# Patient Record
Sex: FEMALE | Race: WHITE
[De-identification: names, ages, dates, MRNs, and addresses within clinical notes are randomized per-mention and may not be internally consistent; named-entity substitution may affect disease eponyms.]

---

## 2017-07-17 ENCOUNTER — TRANSCRIPTION ENCOUNTER (OUTPATIENT)
Age: 27
End: 2017-07-17

## 2018-02-25 ENCOUNTER — TRANSCRIPTION ENCOUNTER (OUTPATIENT)
Age: 28
End: 2018-02-25

## 2018-06-25 ENCOUNTER — TRANSCRIPTION ENCOUNTER (OUTPATIENT)
Age: 28
End: 2018-06-25

## 2018-09-07 ENCOUNTER — APPOINTMENT (OUTPATIENT)
Dept: FAMILY MEDICINE | Facility: CLINIC | Age: 28
End: 2018-09-07
Payer: COMMERCIAL

## 2018-09-07 VITALS
BODY MASS INDEX: 19.32 KG/M2 | OXYGEN SATURATION: 98 % | RESPIRATION RATE: 13 BRPM | HEIGHT: 62 IN | HEART RATE: 107 BPM | TEMPERATURE: 98 F | WEIGHT: 105 LBS | SYSTOLIC BLOOD PRESSURE: 108 MMHG | DIASTOLIC BLOOD PRESSURE: 66 MMHG

## 2018-09-07 DIAGNOSIS — Z86.61 PERSONAL HISTORY OF INFECTIONS OF THE CENTRAL NERVOUS SYSTEM: ICD-10-CM

## 2018-09-07 DIAGNOSIS — H04.123 DRY EYE SYNDROME OF BILATERAL LACRIMAL GLANDS: ICD-10-CM

## 2018-09-07 PROCEDURE — 99204 OFFICE O/P NEW MOD 45 MIN: CPT | Mod: 25

## 2018-09-07 PROCEDURE — 36415 COLL VENOUS BLD VENIPUNCTURE: CPT

## 2018-09-10 ENCOUNTER — TRANSCRIPTION ENCOUNTER (OUTPATIENT)
Age: 28
End: 2018-09-10

## 2018-09-10 LAB
ALBUMIN SERPL ELPH-MCNC: 4.6 G/DL
ALP BLD-CCNC: 48 U/L
ALT SERPL-CCNC: 17 U/L
ANION GAP SERPL CALC-SCNC: 14 MMOL/L
AST SERPL-CCNC: 20 U/L
BASOPHILS # BLD AUTO: 0.06 K/UL
BASOPHILS NFR BLD AUTO: 1.2 %
BILIRUB SERPL-MCNC: 1.2 MG/DL
BUN SERPL-MCNC: 12 MG/DL
CALCIUM SERPL-MCNC: 9.9 MG/DL
CHLORIDE SERPL-SCNC: 104 MMOL/L
CO2 SERPL-SCNC: 24 MMOL/L
CREAT SERPL-MCNC: 0.69 MG/DL
CRP SERPL-MCNC: 0.22 MG/DL
EOSINOPHIL # BLD AUTO: 0.06 K/UL
EOSINOPHIL NFR BLD AUTO: 1.2 %
ERYTHROCYTE [SEDIMENTATION RATE] IN BLOOD BY WESTERGREN METHOD: 2 MM/HR
GLUCOSE SERPL-MCNC: 72 MG/DL
HCT VFR BLD CALC: 43.1 %
HGB BLD-MCNC: 14.1 G/DL
IMM GRANULOCYTES NFR BLD AUTO: 0.2 %
LYMPHOCYTES # BLD AUTO: 1.84 K/UL
LYMPHOCYTES NFR BLD AUTO: 36.6 %
MAN DIFF?: NORMAL
MCHC RBC-ENTMCNC: 31 PG
MCHC RBC-ENTMCNC: 32.7 GM/DL
MCV RBC AUTO: 94.7 FL
MONOCYTES # BLD AUTO: 0.25 K/UL
MONOCYTES NFR BLD AUTO: 5 %
NEUTROPHILS # BLD AUTO: 2.81 K/UL
NEUTROPHILS NFR BLD AUTO: 55.8 %
PLATELET # BLD AUTO: 331 K/UL
POTASSIUM SERPL-SCNC: 4.6 MMOL/L
PROT SERPL-MCNC: 7.2 G/DL
RBC # BLD: 4.55 M/UL
RBC # FLD: 13.5 %
RHEUMATOID FACT SER QL: <10 IU/ML
SODIUM SERPL-SCNC: 142 MMOL/L
TSH SERPL-ACNC: 1.3 UIU/ML
URATE SERPL-MCNC: 3.8 MG/DL
VIT B12 SERPL-MCNC: 313 PG/ML
WBC # FLD AUTO: 5.03 K/UL

## 2018-09-11 LAB
DSDNA AB SER-ACNC: 284 IU/ML
EBV DNA SERPL NAA+PROBE-ACNC: NOT DETECTED

## 2018-09-12 ENCOUNTER — APPOINTMENT (OUTPATIENT)
Dept: FAMILY MEDICINE | Facility: CLINIC | Age: 28
End: 2018-09-12
Payer: COMMERCIAL

## 2018-09-12 ENCOUNTER — RESULT REVIEW (OUTPATIENT)
Age: 28
End: 2018-09-12

## 2018-09-12 VITALS
WEIGHT: 105 LBS | OXYGEN SATURATION: 98 % | TEMPERATURE: 99.8 F | HEART RATE: 105 BPM | RESPIRATION RATE: 13 BRPM | DIASTOLIC BLOOD PRESSURE: 68 MMHG | HEIGHT: 62 IN | SYSTOLIC BLOOD PRESSURE: 112 MMHG | BODY MASS INDEX: 19.32 KG/M2

## 2018-09-12 DIAGNOSIS — R76.8 OTHER SPECIFIED ABNORMAL IMMUNOLOGICAL FINDINGS IN SERUM: ICD-10-CM

## 2018-09-12 DIAGNOSIS — M25.50 PAIN IN UNSPECIFIED JOINT: ICD-10-CM

## 2018-09-12 LAB
ANA PAT FLD IF-IMP: ABNORMAL
ANA SER IF-ACNC: ABNORMAL

## 2018-09-12 PROCEDURE — G0008: CPT | Mod: 59

## 2018-09-12 PROCEDURE — 99214 OFFICE O/P EST MOD 30 MIN: CPT | Mod: 25

## 2018-09-12 PROCEDURE — 90686 IIV4 VACC NO PRSV 0.5 ML IM: CPT

## 2018-09-14 ENCOUNTER — RESULT REVIEW (OUTPATIENT)
Age: 28
End: 2018-09-14

## 2018-09-14 LAB

## 2018-09-21 ENCOUNTER — APPOINTMENT (OUTPATIENT)
Dept: FAMILY MEDICINE | Facility: CLINIC | Age: 28
End: 2018-09-21

## 2018-10-10 ENCOUNTER — APPOINTMENT (OUTPATIENT)
Dept: FAMILY MEDICINE | Facility: CLINIC | Age: 28
End: 2018-10-10
Payer: COMMERCIAL

## 2018-10-10 ENCOUNTER — LABORATORY RESULT (OUTPATIENT)
Age: 28
End: 2018-10-10

## 2018-10-10 VITALS
HEIGHT: 62 IN | TEMPERATURE: 98.7 F | BODY MASS INDEX: 19.32 KG/M2 | OXYGEN SATURATION: 97 % | RESPIRATION RATE: 13 BRPM | HEART RATE: 80 BPM | SYSTOLIC BLOOD PRESSURE: 110 MMHG | DIASTOLIC BLOOD PRESSURE: 78 MMHG | WEIGHT: 105 LBS

## 2018-10-10 PROCEDURE — 36415 COLL VENOUS BLD VENIPUNCTURE: CPT

## 2018-10-10 PROCEDURE — 99214 OFFICE O/P EST MOD 30 MIN: CPT | Mod: 25

## 2018-10-11 LAB
ALBUMIN MFR SERPL ELPH: 62.5 %
ALBUMIN SERPL-MCNC: 4.3 G/DL
ALBUMIN/GLOB SERPL: 1.7 RATIO
ALPHA1 GLOB MFR SERPL ELPH: 4.9 %
ALPHA1 GLOB SERPL ELPH-MCNC: 0.3 G/DL
ALPHA2 GLOB MFR SERPL ELPH: 8.9 %
ALPHA2 GLOB SERPL ELPH-MCNC: 0.6 G/DL
B-GLOBULIN MFR SERPL ELPH: 10.3 %
B-GLOBULIN SERPL ELPH-MCNC: 0.7 G/DL
BASOPHILS # BLD AUTO: 0.04 K/UL
BASOPHILS NFR BLD AUTO: 0.8 %
CALCIUM SERPL-MCNC: 10.1 MG/DL
EOSINOPHIL # BLD AUTO: 0.08 K/UL
EOSINOPHIL NFR BLD AUTO: 1.5 %
GAMMA GLOB FLD ELPH-MCNC: 0.9 G/DL
GAMMA GLOB MFR SERPL ELPH: 13.4 %
HCT VFR BLD CALC: 41.4 %
HGB BLD-MCNC: 13.3 G/DL
IMM GRANULOCYTES NFR BLD AUTO: 0.2 %
INTERPRETATION SERPL IEP-IMP: NORMAL
LYMPHOCYTES # BLD AUTO: 1.77 K/UL
LYMPHOCYTES NFR BLD AUTO: 34.2 %
MAN DIFF?: NORMAL
MCHC RBC-ENTMCNC: 30.3 PG
MCHC RBC-ENTMCNC: 32.1 GM/DL
MCV RBC AUTO: 94.3 FL
MONOCYTES # BLD AUTO: 0.28 K/UL
MONOCYTES NFR BLD AUTO: 5.4 %
NEUTROPHILS # BLD AUTO: 3 K/UL
NEUTROPHILS NFR BLD AUTO: 57.9 %
PLATELET # BLD AUTO: 316 K/UL
PROT SERPL-MCNC: 6.9 G/DL
PROT SERPL-MCNC: 6.9 G/DL
RBC # BLD: 4.39 M/UL
RBC # FLD: 13.2 %
WBC # FLD AUTO: 5.18 K/UL

## 2018-10-15 ENCOUNTER — EMERGENCY (EMERGENCY)
Facility: HOSPITAL | Age: 28
LOS: 1 days | Discharge: HOME | End: 2018-10-15
Attending: EMERGENCY MEDICINE
Payer: COMMERCIAL

## 2018-10-15 ENCOUNTER — RESULT REVIEW (OUTPATIENT)
Age: 28
End: 2018-10-15

## 2018-10-15 ENCOUNTER — TRANSCRIPTION ENCOUNTER (OUTPATIENT)
Age: 28
End: 2018-10-15

## 2018-10-15 VITALS
DIASTOLIC BLOOD PRESSURE: 60 MMHG | OXYGEN SATURATION: 99 % | SYSTOLIC BLOOD PRESSURE: 108 MMHG | RESPIRATION RATE: 16 BRPM | HEART RATE: 113 BPM

## 2018-10-15 VITALS — WEIGHT: 104.94 LBS | HEIGHT: 61 IN

## 2018-10-15 DIAGNOSIS — Z98.82 BREAST IMPLANT STATUS: Chronic | ICD-10-CM

## 2018-10-15 DIAGNOSIS — M32.9 SYSTEMIC LUPUS ERYTHEMATOSUS, UNSPECIFIED: ICD-10-CM

## 2018-10-15 DIAGNOSIS — I30.1 INFECTIVE PERICARDITIS: ICD-10-CM

## 2018-10-15 DIAGNOSIS — R07.89 OTHER CHEST PAIN: ICD-10-CM

## 2018-10-15 DIAGNOSIS — Z98.890 OTHER SPECIFIED POSTPROCEDURAL STATES: Chronic | ICD-10-CM

## 2018-10-15 LAB
ALBUMIN SERPL ELPH-MCNC: 4.3 G/DL — SIGNIFICANT CHANGE UP (ref 3.3–5.2)
ALP SERPL-CCNC: 47 U/L — SIGNIFICANT CHANGE UP (ref 40–120)
ALT FLD-CCNC: 10 U/L — SIGNIFICANT CHANGE UP
ANION GAP SERPL CALC-SCNC: 12 MMOL/L — SIGNIFICANT CHANGE UP (ref 5–17)
AST SERPL-CCNC: 14 U/L — SIGNIFICANT CHANGE UP
BASOPHILS # BLD AUTO: 0 K/UL — SIGNIFICANT CHANGE UP (ref 0–0.2)
BASOPHILS NFR BLD AUTO: 0.1 % — SIGNIFICANT CHANGE UP (ref 0–2)
BILIRUB SERPL-MCNC: 1.5 MG/DL — SIGNIFICANT CHANGE UP (ref 0.4–2)
BUN SERPL-MCNC: 9 MG/DL — SIGNIFICANT CHANGE UP (ref 8–20)
CALCIUM SERPL-MCNC: 10.1 MG/DL — SIGNIFICANT CHANGE UP (ref 8.6–10.2)
CHLORIDE SERPL-SCNC: 104 MMOL/L — SIGNIFICANT CHANGE UP (ref 98–107)
CO2 SERPL-SCNC: 24 MMOL/L — SIGNIFICANT CHANGE UP (ref 22–29)
CREAT SERPL-MCNC: 0.54 MG/DL — SIGNIFICANT CHANGE UP (ref 0.5–1.3)
D DIMER BLD IA.RAPID-MCNC: 918 NG/ML DDU — HIGH
EOSINOPHIL # BLD AUTO: 0.1 K/UL — SIGNIFICANT CHANGE UP (ref 0–0.5)
EOSINOPHIL NFR BLD AUTO: 1.3 % — SIGNIFICANT CHANGE UP (ref 0–6)
GLUCOSE SERPL-MCNC: 91 MG/DL — SIGNIFICANT CHANGE UP (ref 70–115)
HCT VFR BLD CALC: 42.1 % — SIGNIFICANT CHANGE UP (ref 37–47)
HGB BLD-MCNC: 14.2 G/DL — SIGNIFICANT CHANGE UP (ref 12–16)
IGA 24H UR QL IFE: NORMAL
LACTATE BLDV-MCNC: 1.5 MMOL/L — SIGNIFICANT CHANGE UP (ref 0.5–2)
LYMPHOCYTES # BLD AUTO: 0.7 K/UL — LOW (ref 1–4.8)
LYMPHOCYTES # BLD AUTO: 6.6 % — LOW (ref 20–55)
MCHC RBC-ENTMCNC: 31.1 PG — HIGH (ref 27–31)
MCHC RBC-ENTMCNC: 33.7 G/DL — SIGNIFICANT CHANGE UP (ref 32–36)
MCV RBC AUTO: 92.3 FL — SIGNIFICANT CHANGE UP (ref 81–99)
MONOCYTES # BLD AUTO: 0.2 K/UL — SIGNIFICANT CHANGE UP (ref 0–0.8)
MONOCYTES NFR BLD AUTO: 1.9 % — LOW (ref 3–10)
NEUTROPHILS # BLD AUTO: 10 K/UL — HIGH (ref 1.8–8)
NEUTROPHILS NFR BLD AUTO: 89.7 % — HIGH (ref 37–73)
PLATELET # BLD AUTO: 225 K/UL — SIGNIFICANT CHANGE UP (ref 150–400)
POTASSIUM SERPL-MCNC: 4 MMOL/L — SIGNIFICANT CHANGE UP (ref 3.5–5.3)
POTASSIUM SERPL-SCNC: 4 MMOL/L — SIGNIFICANT CHANGE UP (ref 3.5–5.3)
PROT SERPL-MCNC: 7.3 G/DL — SIGNIFICANT CHANGE UP (ref 6.6–8.7)
RAPID RVP RESULT: DETECTED
RBC # BLD: 4.56 M/UL — SIGNIFICANT CHANGE UP (ref 4.4–5.2)
RBC # FLD: 12.7 % — SIGNIFICANT CHANGE UP (ref 11–15.6)
RV+EV RNA SPEC QL NAA+PROBE: DETECTED
SODIUM SERPL-SCNC: 140 MMOL/L — SIGNIFICANT CHANGE UP (ref 135–145)
TROPONIN T SERPL-MCNC: <0.01 NG/ML — SIGNIFICANT CHANGE UP (ref 0–0.06)
WBC # BLD: 11.1 K/UL — HIGH (ref 4.8–10.8)
WBC # FLD AUTO: 11.1 K/UL — HIGH (ref 4.8–10.8)

## 2018-10-15 PROCEDURE — 85379 FIBRIN DEGRADATION QUANT: CPT

## 2018-10-15 PROCEDURE — 84702 CHORIONIC GONADOTROPIN TEST: CPT

## 2018-10-15 PROCEDURE — 96367 TX/PROPH/DG ADDL SEQ IV INF: CPT | Mod: XU

## 2018-10-15 PROCEDURE — 71275 CT ANGIOGRAPHY CHEST: CPT | Mod: 26

## 2018-10-15 PROCEDURE — 87581 M.PNEUMON DNA AMP PROBE: CPT

## 2018-10-15 PROCEDURE — 93010 ELECTROCARDIOGRAM REPORT: CPT

## 2018-10-15 PROCEDURE — 93306 TTE W/DOPPLER COMPLETE: CPT

## 2018-10-15 PROCEDURE — 71275 CT ANGIOGRAPHY CHEST: CPT

## 2018-10-15 PROCEDURE — 99285 EMERGENCY DEPT VISIT HI MDM: CPT

## 2018-10-15 PROCEDURE — 99284 EMERGENCY DEPT VISIT MOD MDM: CPT | Mod: 25

## 2018-10-15 PROCEDURE — 87040 BLOOD CULTURE FOR BACTERIA: CPT

## 2018-10-15 PROCEDURE — 80053 COMPREHEN METABOLIC PANEL: CPT

## 2018-10-15 PROCEDURE — 96361 HYDRATE IV INFUSION ADD-ON: CPT

## 2018-10-15 PROCEDURE — 36415 COLL VENOUS BLD VENIPUNCTURE: CPT

## 2018-10-15 PROCEDURE — 87798 DETECT AGENT NOS DNA AMP: CPT

## 2018-10-15 PROCEDURE — 87633 RESP VIRUS 12-25 TARGETS: CPT

## 2018-10-15 PROCEDURE — 85652 RBC SED RATE AUTOMATED: CPT

## 2018-10-15 PROCEDURE — 86140 C-REACTIVE PROTEIN: CPT

## 2018-10-15 PROCEDURE — 71046 X-RAY EXAM CHEST 2 VIEWS: CPT | Mod: 26

## 2018-10-15 PROCEDURE — 85027 COMPLETE CBC AUTOMATED: CPT

## 2018-10-15 PROCEDURE — 84484 ASSAY OF TROPONIN QUANT: CPT

## 2018-10-15 PROCEDURE — 96365 THER/PROPH/DIAG IV INF INIT: CPT | Mod: XU

## 2018-10-15 PROCEDURE — 87486 CHLMYD PNEUM DNA AMP PROBE: CPT

## 2018-10-15 PROCEDURE — 71046 X-RAY EXAM CHEST 2 VIEWS: CPT

## 2018-10-15 PROCEDURE — 99284 EMERGENCY DEPT VISIT MOD MDM: CPT

## 2018-10-15 PROCEDURE — 93005 ELECTROCARDIOGRAM TRACING: CPT

## 2018-10-15 PROCEDURE — 83605 ASSAY OF LACTIC ACID: CPT

## 2018-10-15 RX ORDER — VANCOMYCIN HCL 1 G
1000 VIAL (EA) INTRAVENOUS ONCE
Refills: 0 | Status: COMPLETED | OUTPATIENT
Start: 2018-10-15 | End: 2018-10-15

## 2018-10-15 RX ORDER — PIPERACILLIN AND TAZOBACTAM 4; .5 G/20ML; G/20ML
3.38 INJECTION, POWDER, LYOPHILIZED, FOR SOLUTION INTRAVENOUS ONCE
Refills: 0 | Status: COMPLETED | OUTPATIENT
Start: 2018-10-15 | End: 2018-10-15

## 2018-10-15 RX ORDER — SODIUM CHLORIDE 9 MG/ML
2000 INJECTION INTRAMUSCULAR; INTRAVENOUS; SUBCUTANEOUS ONCE
Refills: 0 | Status: COMPLETED | OUTPATIENT
Start: 2018-10-15 | End: 2018-10-15

## 2018-10-15 RX ORDER — IBUPROFEN 200 MG
400 TABLET ORAL ONCE
Refills: 0 | Status: COMPLETED | OUTPATIENT
Start: 2018-10-15 | End: 2018-10-15

## 2018-10-15 RX ORDER — IBUPROFEN 200 MG
1 TABLET ORAL
Qty: 21 | Refills: 0
Start: 2018-10-15 | End: 2018-10-21

## 2018-10-15 RX ORDER — FAMOTIDINE 10 MG/ML
1 INJECTION INTRAVENOUS
Qty: 14 | Refills: 0
Start: 2018-10-15 | End: 2018-10-21

## 2018-10-15 RX ADMIN — PIPERACILLIN AND TAZOBACTAM 3.38 GRAM(S): 4; .5 INJECTION, POWDER, LYOPHILIZED, FOR SOLUTION INTRAVENOUS at 13:31

## 2018-10-15 RX ADMIN — SODIUM CHLORIDE 1000 MILLILITER(S): 9 INJECTION INTRAMUSCULAR; INTRAVENOUS; SUBCUTANEOUS at 11:38

## 2018-10-15 RX ADMIN — Medication 1000 MILLIGRAM(S): at 15:54

## 2018-10-15 RX ADMIN — PIPERACILLIN AND TAZOBACTAM 200 GRAM(S): 4; .5 INJECTION, POWDER, LYOPHILIZED, FOR SOLUTION INTRAVENOUS at 13:01

## 2018-10-15 RX ADMIN — Medication 400 MILLIGRAM(S): at 11:38

## 2018-10-15 RX ADMIN — Medication 250 MILLIGRAM(S): at 14:54

## 2018-10-15 RX ADMIN — Medication 400 MILLIGRAM(S): at 12:38

## 2018-10-15 RX ADMIN — SODIUM CHLORIDE 2000 MILLILITER(S): 9 INJECTION INTRAMUSCULAR; INTRAVENOUS; SUBCUTANEOUS at 14:00

## 2018-10-15 NOTE — ED ADULT NURSE NOTE - NSIMPLEMENTINTERV_GEN_ALL_ED
Implemented All Universal Safety Interventions:  [place] to call system. Call bell, personal items and telephone within reach. Instruct patient to call for assistance. Room bathroom lighting operational. Non-slip footwear when patient is off stretcher. Physically safe environment: no spills, clutter or unnecessary equipment. Stretcher in lowest position, wheels locked, appropriate side rails in place.

## 2018-10-15 NOTE — ED PROVIDER NOTE - CARE PLAN
Principal Discharge DX:	URI (upper respiratory infection)  Secondary Diagnosis:	Acute viral pericarditis

## 2018-10-15 NOTE — CONSULT NOTE ADULT - SUBJECTIVE AND OBJECTIVE BOX
[place] [facility]                                                        Office: [address] [address]                                                       Telephone: [phone]. Fax:[phone]                                                              CARDIOLOGY CONSULTATION NOTE                                                                                             Consult requested by:  Johan Montejo MD (ER physician)     Reason for Consultation: chest pain     History obtained by: Patient and medical record     obtained: No    Chief complaint:    Patient is a 28y old  Female who presents with a chief complaint of chest pain     HPI:  This is a 28 year old woman with recent diagnossis of Lupus, 4 weeks ago, On NSAID PRN and on plaquanil presents with chest pain   Onset:   1 days  Type: Pressure;  Intensity:  8 /10 ; Duration: 1  days; each episode last: > 1 hr   Radiation: none ,Associated symptoms: none  Patient states she feels worse when she lies flat.  She has not been able to sleep.  She also noticess fast heart beat.        REVIEW OF SYMPTOMS: Cardiovascular:  See HPI. +  chest pain,  No dyspnea,  No syncope, +  palpitations, No dizziness, No Orthopnea,      No Paroxsymal nocturnal dyspnea;  Respiratory:  No Dyspnea, No cough,     Genitourinary:  No dysuria, no hematuria; Gastrointestinal:  No nausea, no vomiting. No diarrhea.  No abdominal pain. No dark color stool, no melena ; Neurological: No headache, no dizziness, no slurred speech;  Psychiatric: No agitation, no anxiety.  ALL OTHER REVIEW OF SYSTEMS ARE NEGATIVE.    ALLERGIES: Allergies    sulfa drugs (Hives)    Intolerances          CURRENT MEDICATIONS:         HOME MEDICATIONS:  Plaquanil  NSAID PRN     PAST MEDICAL HISTORY  Systemic lupus erythematosus, unspecified SLE type, unspecified organ involvement status      PAST SURGICAL HISTORY  History of rhinoplasty  History of elective breast augmentation      FAMILY HISTORY:  No pertinent family history in first degree relatives      SOCIAL HISTORY:  Denies smoking/alcohol/drugs      Vital Signs Last 24 Hrs  T(C): 38.6 (15 Oct 2018 11:57), Max: 38.6 (15 Oct 2018 11:57)  T(F): 101.4 (15 Oct 2018 11:57), Max: 101.4 (15 Oct 2018 11:57)  HR: 113 (15 Oct 2018 17:08) (101 - 138)  BP: 108/60 (15 Oct 2018 17:08) (99/57 - 119/81)  BP(mean): --  RR: 16 (15 Oct 2018 17:08) (16 - 18)  SpO2: 99% (15 Oct 2018 17:08) (99% - 101%)      PHYSICAL EXAM:  Constitutional: Comfortable . No acute distress.   HEENT: Atraumatic and normcephalic , neck is supple . no JVD. No carotid bruit. PEERL   CNS: A&Ox3. No focal deficits. EOMI. Cranial nerves II-IX are intact.   Lymph Nodes: Cervical : Not palpable.  Respiratory: CTAB  Cardiovascular: S1S2 RRR. No murmur/rubs or gallop.  Gastrointestinal: Soft non-tender and non distended . +Bowel sounds. negative Rubalcava's sign.  Extremities: No edema.   Psychiatric: Calm . no agitation.  Skin: No skin rash/ulcers visualized to face, hands or feet.    Intake and output:     LABS:                        14.2   11.1  )-----------( 225      ( 15 Oct 2018 11:44 )             42.1     10-15    140  |  104  |  9.0  ----------------------------<  91  4.0   |  24.0  |  0.54    Ca    10.1      15 Oct 2018 11:44    TPro  7.3  /  Alb  4.3  /  TBili  1.5  /  DBili  x   /  AST  14  /  ALT  10  /  AlkPhos  47  10-15    CARDIAC MARKERS ( 15 Oct 2018 11:44 )  x     / <0.01 ng/mL / x     / x     / x        ;p-BNP=        INTERPRETATION OF TELEMETRY: Reviewed by me.   ECG: Reviewed by me. sinus tachycardia. otherwise, normal limits.     RADIOLOGY & ADDITIONAL STUDIES:    X-ray:  reviewed by me.  unremarkable     ECHO FINDINGS: Date:   10/15/2018 : Patient tachycardic during the entire study. normal LV size and function. Normal RV. No significant valvular abnormality.   no effusion

## 2018-10-15 NOTE — ED ADULT TRIAGE NOTE - CHIEF COMPLAINT QUOTE
"I feel SOB since yesterday, I have a cough and feel chest tightness. I went to Urgent care and they said my heart rate was elevated (127 bpm) but my chest x-ray was negative and they told me to come here,. I take birth control and they want to r/o PE.

## 2018-10-15 NOTE — ED ADULT NURSE NOTE - OBJECTIVE STATEMENT
Assumed pt care at 1130.  Pt awake, alert and oriented x3 c/o chest pressure, dyspnea on exertion and congestion. Assumed pt care at 1130.  Pt awake, alert and oriented x3 c/o chest pressure, dyspnea on exertion and congestion.  Pt went to urgent care and was referred to ED secondary to tachypnea to r/o PE.

## 2018-10-15 NOTE — CONSULT NOTE ADULT - ATTENDING COMMENTS
No further in-patient cardiac work-up/management is needed.  Follow-up in cardiology office in 2 weeks.

## 2018-10-15 NOTE — ED STATDOCS - OBJECTIVE STATEMENT
Telemedicine assessment was conducted (using real time 2 way audio-video technology) by Dr. Ember Butts located at [address] [place] [id number]  ++++++++++++++++++++++++  Pertinent patient history and initial plan: 29 y/o F with recently dx of lupus. She c/o diffuse muscle aching with heavy sensation pushing on her chest. Pt states she has been unable to sleep secondary to the feeling of a weight on her chest with exacerbation in supine position, denies any pain. Pt took Amoebic and Tylenol yesterday but denies significant relief. mild rhinorrhea noted with congestion of clogged ears and productive green phlegm today. Pt went to Urgent care with resulting tachy vitals, with clear CXR. Pt is s/p flu shot. Telemedicine assessment was conducted (using real time 2 way audio-video technology) by Dr. Ember Butts located at [address] [place] [id number]  ++++++++++++++++++++++++  Pertinent patient history and initial plan: 27 y/o F with recently dx of lupus. She c/o diffuse muscle aching with heavy sensation pushing on her chest x 2-3 days. Pt states she has been unable to sleep secondary to the feeling of a weight on her chest with exacerbation in supine position, denies any pain. Pt took Mobiv and Tylenol yesterday but denies significant relief. + mild rhinorrhea noted with congestion of clogged ears and productive green phlegm today. Pt is on OCPs. Per pt her recent clotting studies have been normal. No travel or calf pain. Has had flu shot. Pt went to Urgent care with resulting tachy vitals, with clear CXR, told to come to the ED to r/o PE.     On virtual exam pt appears well, nad, not tachypneic, tachycardia noted.    Plan - EKG, sx likely secondary to infectious etiology/viral syndrome but will check trop r/o myocarditis, CXR, dimer r/o PE given risk factors (SLE, OCP use)

## 2018-10-15 NOTE — ED ADULT NURSE REASSESSMENT NOTE - NS ED NURSE REASSESS COMMENT FT1
pt remains awake, alert and oriented x3 with no signs of acute distress, sinus tach on cardiac monitor.  Pt aware of plan of care.

## 2018-10-15 NOTE — CONSULT NOTE ADULT - PROBLEM SELECTOR RECOMMENDATION 9
Pain control. Motrin 600 TID for 1 week. after that BID.   F/u in cardiology office in 2 weeks. Repeat echo. ct plaquanil. PPI.   Rest for few days. No work travel to [place].   Hydration.

## 2018-10-15 NOTE — ED PROVIDER NOTE - OBJECTIVE STATEMENT
Patient is a 28F with newly diagnosed SLE presenting to ED for complaint of SOB.  Patient says she began to develop a "tingly" sensation in her throat 2 days ago, that gradually became more bothersome.  Over the next couple of days, the sensation evolved into a sore throat, associated with productive cough, chest heaviness and shortness of breath at rest.  Patient mentions developing a fever yesterday of 101F and orthopnea, prompting her to go to urgent care this morning.  She was found to by tachycardic at 135bpm and was worked up for PE with a negative chest xray.  On ED arrival she still complains of SOB, fever and chills.  She denies any abdominal pain, vomiting, or diarrhea.  Patient confirms current medications of Plaquenil 200mg and OCP.

## 2018-10-20 LAB
CULTURE RESULTS: SIGNIFICANT CHANGE UP
CULTURE RESULTS: SIGNIFICANT CHANGE UP
SPECIMEN SOURCE: SIGNIFICANT CHANGE UP
SPECIMEN SOURCE: SIGNIFICANT CHANGE UP

## 2018-10-31 ENCOUNTER — APPOINTMENT (OUTPATIENT)
Dept: RHEUMATOLOGY | Facility: CLINIC | Age: 28
End: 2018-10-31

## 2019-03-07 ENCOUNTER — TRANSCRIPTION ENCOUNTER (OUTPATIENT)
Age: 29
End: 2019-03-07

## 2020-10-09 ENCOUNTER — APPOINTMENT (OUTPATIENT)
Dept: FAMILY MEDICINE | Facility: CLINIC | Age: 30
End: 2020-10-09
Payer: COMMERCIAL

## 2020-10-09 ENCOUNTER — LABORATORY RESULT (OUTPATIENT)
Age: 30
End: 2020-10-09

## 2020-10-09 ENCOUNTER — NON-APPOINTMENT (OUTPATIENT)
Age: 30
End: 2020-10-09

## 2020-10-09 VITALS
SYSTOLIC BLOOD PRESSURE: 110 MMHG | TEMPERATURE: 97.5 F | HEART RATE: 90 BPM | DIASTOLIC BLOOD PRESSURE: 70 MMHG | BODY MASS INDEX: 19.88 KG/M2 | OXYGEN SATURATION: 98 % | WEIGHT: 108 LBS | HEIGHT: 62 IN | RESPIRATION RATE: 14 BRPM

## 2020-10-09 DIAGNOSIS — L65.9 NONSCARRING HAIR LOSS, UNSPECIFIED: ICD-10-CM

## 2020-10-09 PROCEDURE — 36415 COLL VENOUS BLD VENIPUNCTURE: CPT

## 2020-10-09 PROCEDURE — 90686 IIV4 VACC NO PRSV 0.5 ML IM: CPT

## 2020-10-09 PROCEDURE — G0008: CPT

## 2020-10-09 PROCEDURE — 99395 PREV VISIT EST AGE 18-39: CPT | Mod: 25

## 2020-10-12 LAB
25(OH)D3 SERPL-MCNC: 47.2 NG/ML
ALBUMIN SERPL ELPH-MCNC: 5.1 G/DL
ALP BLD-CCNC: 53 U/L
ALT SERPL-CCNC: 13 U/L
ANA SER IF-ACNC: NEGATIVE
ANION GAP SERPL CALC-SCNC: 17 MMOL/L
APPEARANCE: CLEAR
AST SERPL-CCNC: 18 U/L
BILIRUB SERPL-MCNC: 1.6 MG/DL
BILIRUBIN URINE: NEGATIVE
BLOOD URINE: ABNORMAL
BUN SERPL-MCNC: 12 MG/DL
C PEPTIDE SERPL-MCNC: 1.4 NG/ML
CALCIUM SERPL-MCNC: 10.7 MG/DL
CHLORIDE SERPL-SCNC: 103 MMOL/L
CHOLEST SERPL-MCNC: 171 MG/DL
CHOLEST/HDLC SERPL: 2.5 RATIO
CO2 SERPL-SCNC: 20 MMOL/L
COLOR: YELLOW
CREAT SERPL-MCNC: 0.67 MG/DL
CREAT SPEC-SCNC: 157 MG/DL
CRP SERPL-MCNC: 0.18 MG/DL
ERYTHROCYTE [SEDIMENTATION RATE] IN BLOOD BY WESTERGREN METHOD: 8 MM/HR
ESTIMATED AVERAGE GLUCOSE: 82 MG/DL
FERRITIN SERPL-MCNC: 83 NG/ML
FOLATE SERPL-MCNC: 12.6 NG/ML
GLUCOSE QUALITATIVE U: NEGATIVE
GLUCOSE SERPL-MCNC: 67 MG/DL
HBA1C MFR BLD HPLC: 4.5 %
HDLC SERPL-MCNC: 69 MG/DL
IRON SATN MFR SERPL: 55 %
IRON SERPL-MCNC: 217 UG/DL
KETONES URINE: NEGATIVE
LDLC SERPL CALC-MCNC: 85 MG/DL
LEUKOCYTE ESTERASE URINE: NEGATIVE
MICROALBUMIN 24H UR DL<=1MG/L-MCNC: 21.7 MG/DL
MICROALBUMIN/CREAT 24H UR-RTO: 138 MG/G
NITRITE URINE: NEGATIVE
PH URINE: 6
POTASSIUM SERPL-SCNC: 4.9 MMOL/L
PROT SERPL-MCNC: 7.5 G/DL
PROTEIN URINE: ABNORMAL
RHEUMATOID FACT SER QL: <10 IU/ML
SARS-COV-2 IGG SERPL IA-ACNC: <0.1 INDEX
SARS-COV-2 IGG SERPL QL IA: NEGATIVE
SODIUM SERPL-SCNC: 140 MMOL/L
SPECIFIC GRAVITY URINE: 1.02
T4 SERPL-MCNC: 11.9 UG/DL
TIBC SERPL-MCNC: 394 UG/DL
TRIGL SERPL-MCNC: 83 MG/DL
TSH SERPL-ACNC: 1.29 UIU/ML
UIBC SERPL-MCNC: 177 UG/DL
UROBILINOGEN URINE: NORMAL
VIT B12 SERPL-MCNC: 359 PG/ML

## 2020-10-15 LAB
CCP AB SER IA-ACNC: <8 UNITS
RF+CCP IGG SER-IMP: NEGATIVE

## 2020-11-03 LAB
BASOPHILS # BLD AUTO: 0.07 K/UL
BASOPHILS NFR BLD AUTO: 1.1 %
EOSINOPHIL # BLD AUTO: 0.05 K/UL
EOSINOPHIL NFR BLD AUTO: 0.8 %
HCT VFR BLD CALC: 48.6 %
HGB BLD-MCNC: 15.6 G/DL
IMM GRANULOCYTES NFR BLD AUTO: 0.2 %
LYMPHOCYTES # BLD AUTO: 1.83 K/UL
LYMPHOCYTES NFR BLD AUTO: 29.9 %
MAN DIFF?: NORMAL
MCHC RBC-ENTMCNC: 31.2 PG
MCHC RBC-ENTMCNC: 32.1 GM/DL
MCV RBC AUTO: 97.2 FL
MONOCYTES # BLD AUTO: 0.28 K/UL
MONOCYTES NFR BLD AUTO: 4.6 %
NEUTROPHILS # BLD AUTO: 3.88 K/UL
NEUTROPHILS NFR BLD AUTO: 63.4 %
PLATELET # BLD AUTO: 339 K/UL
RBC # BLD: 5 M/UL
RBC # FLD: 12.2 %
WBC # FLD AUTO: 6.12 K/UL

## 2020-11-09 ENCOUNTER — APPOINTMENT (OUTPATIENT)
Dept: FAMILY MEDICINE | Facility: CLINIC | Age: 30
End: 2020-11-09
Payer: COMMERCIAL

## 2020-11-09 VITALS
HEIGHT: 62 IN | BODY MASS INDEX: 19.14 KG/M2 | RESPIRATION RATE: 14 BRPM | TEMPERATURE: 97.3 F | SYSTOLIC BLOOD PRESSURE: 110 MMHG | HEART RATE: 87 BPM | DIASTOLIC BLOOD PRESSURE: 70 MMHG | WEIGHT: 104 LBS | OXYGEN SATURATION: 98 %

## 2020-11-09 DIAGNOSIS — E83.19 OTHER DISORDERS OF IRON METABOLISM: ICD-10-CM

## 2020-11-09 DIAGNOSIS — R76.8 OTHER SPECIFIED ABNORMAL IMMUNOLOGICAL FINDINGS IN SERUM: ICD-10-CM

## 2020-11-09 PROCEDURE — 99214 OFFICE O/P EST MOD 30 MIN: CPT | Mod: 25

## 2020-11-09 PROCEDURE — 36415 COLL VENOUS BLD VENIPUNCTURE: CPT

## 2020-11-09 PROCEDURE — 99072 ADDL SUPL MATRL&STAF TM PHE: CPT

## 2020-11-10 LAB
CREAT SPEC-SCNC: 17 MG/DL
DSDNA AB SER-ACNC: 426 IU/ML
MICROALBUMIN 24H UR DL<=1MG/L-MCNC: <1.2 MG/DL
MICROALBUMIN/CREAT 24H UR-RTO: NORMAL MG/G

## 2020-11-13 LAB — HFE GENE MUT ANL BLD/T: NORMAL

## 2021-01-07 ENCOUNTER — APPOINTMENT (OUTPATIENT)
Dept: FAMILY MEDICINE | Facility: CLINIC | Age: 31
End: 2021-01-07
Payer: COMMERCIAL

## 2021-01-07 ENCOUNTER — NON-APPOINTMENT (OUTPATIENT)
Age: 31
End: 2021-01-07

## 2021-01-07 VITALS
HEIGHT: 62 IN | RESPIRATION RATE: 14 BRPM | WEIGHT: 110 LBS | BODY MASS INDEX: 20.24 KG/M2 | HEART RATE: 90 BPM | TEMPERATURE: 98.9 F | OXYGEN SATURATION: 98 % | SYSTOLIC BLOOD PRESSURE: 110 MMHG | DIASTOLIC BLOOD PRESSURE: 70 MMHG

## 2021-01-07 DIAGNOSIS — Z30.41 ENCOUNTER FOR SURVEILLANCE OF CONTRACEPTIVE PILLS: ICD-10-CM

## 2021-01-07 DIAGNOSIS — Z82.61 FAMILY HISTORY OF ARTHRITIS: ICD-10-CM

## 2021-01-07 DIAGNOSIS — Z72.3 LACK OF PHYSICAL EXERCISE: ICD-10-CM

## 2021-01-07 DIAGNOSIS — Z82.5 FAMILY HISTORY OF ASTHMA AND OTHER CHRONIC LOWER RESPIRATORY DISEASES: ICD-10-CM

## 2021-01-07 DIAGNOSIS — Z20.822 CONTACT WITH AND (SUSPECTED) EXPOSURE TO COVID-19: ICD-10-CM

## 2021-01-07 DIAGNOSIS — Z78.9 OTHER SPECIFIED HEALTH STATUS: ICD-10-CM

## 2021-01-07 PROCEDURE — 99072 ADDL SUPL MATRL&STAF TM PHE: CPT

## 2021-01-07 PROCEDURE — 93000 ELECTROCARDIOGRAM COMPLETE: CPT

## 2021-01-07 PROCEDURE — 36415 COLL VENOUS BLD VENIPUNCTURE: CPT

## 2021-01-07 PROCEDURE — 99215 OFFICE O/P EST HI 40 MIN: CPT | Mod: 25

## 2021-01-08 LAB
ALBUMIN SERPL ELPH-MCNC: 4.8 G/DL
ALP BLD-CCNC: 58 U/L
ALT SERPL-CCNC: 19 U/L
ANION GAP SERPL CALC-SCNC: 11 MMOL/L
APPEARANCE: CLEAR
APTT BLD: 30.2 SEC
AST SERPL-CCNC: 17 U/L
BASOPHILS # BLD AUTO: 0.08 K/UL
BASOPHILS NFR BLD AUTO: 1.1 %
BILIRUB SERPL-MCNC: 1.2 MG/DL
BILIRUBIN URINE: NEGATIVE
BLOOD URINE: NEGATIVE
BUN SERPL-MCNC: 11 MG/DL
CALCIUM SERPL-MCNC: 10.8 MG/DL
CHLORIDE SERPL-SCNC: 102 MMOL/L
CO2 SERPL-SCNC: 25 MMOL/L
COLOR: COLORLESS
CREAT SERPL-MCNC: 0.8 MG/DL
EOSINOPHIL # BLD AUTO: 0.03 K/UL
EOSINOPHIL NFR BLD AUTO: 0.4 %
GLUCOSE QUALITATIVE U: NEGATIVE
GLUCOSE SERPL-MCNC: 87 MG/DL
HCT VFR BLD CALC: 46.1 %
HGB BLD-MCNC: 15.1 G/DL
IMM GRANULOCYTES NFR BLD AUTO: 0.3 %
INR PPP: 0.9 RATIO
KETONES URINE: NEGATIVE
LEUKOCYTE ESTERASE URINE: NEGATIVE
LYMPHOCYTES # BLD AUTO: 2.16 K/UL
LYMPHOCYTES NFR BLD AUTO: 29.4 %
MAN DIFF?: NORMAL
MCHC RBC-ENTMCNC: 31.7 PG
MCHC RBC-ENTMCNC: 32.8 GM/DL
MCV RBC AUTO: 96.8 FL
MONOCYTES # BLD AUTO: 0.41 K/UL
MONOCYTES NFR BLD AUTO: 5.6 %
NEUTROPHILS # BLD AUTO: 4.65 K/UL
NEUTROPHILS NFR BLD AUTO: 63.2 %
NITRITE URINE: NEGATIVE
PH URINE: 6
PLATELET # BLD AUTO: 339 K/UL
POTASSIUM SERPL-SCNC: 4.3 MMOL/L
PROT SERPL-MCNC: 7.4 G/DL
PROTEIN URINE: NEGATIVE
PT BLD: 10.7 SEC
RBC # BLD: 4.76 M/UL
RBC # FLD: 12 %
SODIUM SERPL-SCNC: 138 MMOL/L
SPECIFIC GRAVITY URINE: 1.01
UROBILINOGEN URINE: NORMAL
WBC # FLD AUTO: 7.35 K/UL

## 2021-01-12 LAB — SARS-COV-2 N GENE NPH QL NAA+PROBE: NOT DETECTED

## 2021-05-20 ENCOUNTER — APPOINTMENT (OUTPATIENT)
Dept: PLASTIC SURGERY | Facility: CLINIC | Age: 31
End: 2021-05-20
Payer: COMMERCIAL

## 2021-05-20 VITALS
OXYGEN SATURATION: 100 % | HEIGHT: 62 IN | BODY MASS INDEX: 19.32 KG/M2 | HEART RATE: 98 BPM | WEIGHT: 105 LBS | TEMPERATURE: 98.5 F | SYSTOLIC BLOOD PRESSURE: 115 MMHG | DIASTOLIC BLOOD PRESSURE: 78 MMHG

## 2021-05-20 DIAGNOSIS — T85.848A PAIN DUE TO OTHER INTERNAL PROSTHETIC DEVICES, IMPLANTS AND GRAFTS, INITIAL ENCOUNTER: ICD-10-CM

## 2021-05-20 PROCEDURE — 99204 OFFICE O/P NEW MOD 45 MIN: CPT

## 2021-05-20 PROCEDURE — 99072 ADDL SUPL MATRL&STAF TM PHE: CPT

## 2021-05-28 ENCOUNTER — APPOINTMENT (OUTPATIENT)
Dept: OBGYN | Facility: CLINIC | Age: 31
End: 2021-05-28
Payer: COMMERCIAL

## 2021-05-28 VITALS — BODY MASS INDEX: 19.48 KG/M2 | SYSTOLIC BLOOD PRESSURE: 109 MMHG | DIASTOLIC BLOOD PRESSURE: 73 MMHG | WEIGHT: 106.5 LBS

## 2021-05-28 DIAGNOSIS — Z30.09 ENCOUNTER FOR OTHER GENERAL COUNSELING AND ADVICE ON CONTRACEPTION: ICD-10-CM

## 2021-05-28 DIAGNOSIS — N94.6 DYSMENORRHEA, UNSPECIFIED: ICD-10-CM

## 2021-05-28 PROCEDURE — 99203 OFFICE O/P NEW LOW 30 MIN: CPT

## 2021-05-28 PROCEDURE — 99072 ADDL SUPL MATRL&STAF TM PHE: CPT

## 2021-05-28 RX ORDER — PRENATAL VIT NO.130/IRON/FOLIC 27MG-0.8MG
28-0.8 TABLET ORAL DAILY
Qty: 90 | Refills: 3 | Status: ACTIVE | COMMUNITY
Start: 2021-05-28 | End: 1900-01-01

## 2021-05-29 LAB
C TRACH RRNA SPEC QL NAA+PROBE: NOT DETECTED
HPV HIGH+LOW RISK DNA PNL CVX: NOT DETECTED
N GONORRHOEA RRNA SPEC QL NAA+PROBE: NOT DETECTED
SOURCE TP AMPLIFICATION: NORMAL

## 2021-06-02 LAB — CYTOLOGY CVX/VAG DOC THIN PREP: NORMAL

## 2021-06-23 ENCOUNTER — TRANSCRIPTION ENCOUNTER (OUTPATIENT)
Age: 31
End: 2021-06-23

## 2021-07-05 ENCOUNTER — TRANSCRIPTION ENCOUNTER (OUTPATIENT)
Age: 31
End: 2021-07-05

## 2021-08-10 ENCOUNTER — APPOINTMENT (OUTPATIENT)
Age: 31
End: 2021-08-10
Payer: COMMERCIAL

## 2021-08-10 ENCOUNTER — NON-APPOINTMENT (OUTPATIENT)
Age: 31
End: 2021-08-10

## 2021-08-10 ENCOUNTER — APPOINTMENT (OUTPATIENT)
Dept: FAMILY MEDICINE | Facility: CLINIC | Age: 31
End: 2021-08-10
Payer: COMMERCIAL

## 2021-08-10 VITALS
RESPIRATION RATE: 16 BRPM | BODY MASS INDEX: 19.32 KG/M2 | SYSTOLIC BLOOD PRESSURE: 97 MMHG | HEIGHT: 62 IN | TEMPERATURE: 97.3 F | WEIGHT: 105 LBS | HEART RATE: 85 BPM | DIASTOLIC BLOOD PRESSURE: 62 MMHG

## 2021-08-10 VITALS
RESPIRATION RATE: 14 BRPM | OXYGEN SATURATION: 99 % | BODY MASS INDEX: 19.88 KG/M2 | HEART RATE: 79 BPM | SYSTOLIC BLOOD PRESSURE: 109 MMHG | TEMPERATURE: 97.8 F | HEIGHT: 62 IN | WEIGHT: 108 LBS | DIASTOLIC BLOOD PRESSURE: 72 MMHG

## 2021-08-10 PROCEDURE — 93000 ELECTROCARDIOGRAM COMPLETE: CPT

## 2021-08-10 PROCEDURE — 99213 OFFICE O/P EST LOW 20 MIN: CPT

## 2021-08-10 PROCEDURE — 99214 OFFICE O/P EST MOD 30 MIN: CPT | Mod: 25

## 2021-08-10 PROCEDURE — 36415 COLL VENOUS BLD VENIPUNCTURE: CPT

## 2021-08-10 RX ORDER — NORETHINDRONE ACETATE AND ETHINYL ESTRADIOL, ETHINYL ESTRADIOL AND FERROUS FUMARATE 1MG-10(24)
1 MG-10 MCG / KIT ORAL
Qty: 3 | Refills: 2 | Status: DISCONTINUED | COMMUNITY
End: 2021-08-10

## 2021-08-10 RX ORDER — OXYCODONE 5 MG/1
5 TABLET ORAL
Qty: 10 | Refills: 0 | Status: DISCONTINUED | COMMUNITY
Start: 2021-08-06 | End: 2021-08-10

## 2021-08-10 RX ORDER — HYDROXYCHLOROQUINE SULFATE 200 MG/1
200 TABLET, FILM COATED ORAL DAILY
Qty: 30 | Refills: 0 | Status: DISCONTINUED | COMMUNITY
Start: 2018-10-10 | End: 2021-08-10

## 2021-08-11 LAB
ALBUMIN SERPL ELPH-MCNC: 4.6 G/DL
ALP BLD-CCNC: 45 U/L
ALT SERPL-CCNC: 16 U/L
ANION GAP SERPL CALC-SCNC: 12 MMOL/L
APTT BLD: 31.7 SEC
AST SERPL-CCNC: 18 U/L
BASOPHILS # BLD AUTO: 0.05 K/UL
BASOPHILS NFR BLD AUTO: 0.6 %
BILIRUB SERPL-MCNC: 0.8 MG/DL
BUN SERPL-MCNC: 8 MG/DL
CALCIUM SERPL-MCNC: 9.7 MG/DL
CHLORIDE SERPL-SCNC: 105 MMOL/L
CO2 SERPL-SCNC: 23 MMOL/L
CREAT SERPL-MCNC: 0.61 MG/DL
EOSINOPHIL # BLD AUTO: 0.03 K/UL
EOSINOPHIL NFR BLD AUTO: 0.4 %
GLUCOSE SERPL-MCNC: 82 MG/DL
HCG SERPL QL: NEGATIVE
HCT VFR BLD CALC: 42.4 %
HGB BLD-MCNC: 14.1 G/DL
IMM GRANULOCYTES NFR BLD AUTO: 0.1 %
INR PPP: 0.88 RATIO
LYMPHOCYTES # BLD AUTO: 2.02 K/UL
LYMPHOCYTES NFR BLD AUTO: 25.6 %
MAN DIFF?: NORMAL
MCHC RBC-ENTMCNC: 31.7 PG
MCHC RBC-ENTMCNC: 33.3 GM/DL
MCV RBC AUTO: 95.3 FL
MONOCYTES # BLD AUTO: 0.32 K/UL
MONOCYTES NFR BLD AUTO: 4.1 %
NEUTROPHILS # BLD AUTO: 5.46 K/UL
NEUTROPHILS NFR BLD AUTO: 69.2 %
PAPP-A SERPL-ACNC: <1 MIU/ML
PLATELET # BLD AUTO: 332 K/UL
POTASSIUM SERPL-SCNC: 4.2 MMOL/L
PROT SERPL-MCNC: 7.2 G/DL
PT BLD: 10.4 SEC
RBC # BLD: 4.45 M/UL
RBC # FLD: 11.9 %
SODIUM SERPL-SCNC: 140 MMOL/L
WBC # FLD AUTO: 7.89 K/UL

## 2021-08-12 ENCOUNTER — APPOINTMENT (OUTPATIENT)
Age: 31
End: 2021-08-12
Payer: COMMERCIAL

## 2021-08-12 ENCOUNTER — RESULT REVIEW (OUTPATIENT)
Age: 31
End: 2021-08-12

## 2021-08-12 PROCEDURE — 15734 MUSCLE-SKIN GRAFT TRUNK: CPT | Mod: 59

## 2021-08-12 PROCEDURE — 19371 PERI-IMPLT CAPSLC BRST COMPL: CPT | Mod: 50

## 2021-08-17 ENCOUNTER — APPOINTMENT (OUTPATIENT)
Dept: PLASTIC SURGERY | Facility: CLINIC | Age: 31
End: 2021-08-17
Payer: COMMERCIAL

## 2021-08-17 VITALS
HEART RATE: 89 BPM | HEIGHT: 62 IN | DIASTOLIC BLOOD PRESSURE: 71 MMHG | TEMPERATURE: 97.4 F | WEIGHT: 105 LBS | SYSTOLIC BLOOD PRESSURE: 104 MMHG | BODY MASS INDEX: 19.32 KG/M2 | OXYGEN SATURATION: 98 %

## 2021-08-17 PROCEDURE — 99024 POSTOP FOLLOW-UP VISIT: CPT

## 2021-08-24 ENCOUNTER — APPOINTMENT (OUTPATIENT)
Dept: PLASTIC SURGERY | Facility: CLINIC | Age: 31
End: 2021-08-24
Payer: COMMERCIAL

## 2021-08-24 VITALS
TEMPERATURE: 97.7 F | OXYGEN SATURATION: 98 % | HEART RATE: 98 BPM | HEIGHT: 62 IN | SYSTOLIC BLOOD PRESSURE: 106 MMHG | DIASTOLIC BLOOD PRESSURE: 67 MMHG | WEIGHT: 105 LBS | BODY MASS INDEX: 19.32 KG/M2

## 2021-08-24 PROCEDURE — 14000 TIS TRNFR TRUNK 10 SQ CM/<: CPT

## 2021-08-31 ENCOUNTER — APPOINTMENT (OUTPATIENT)
Dept: PLASTIC SURGERY | Facility: CLINIC | Age: 31
End: 2021-08-31
Payer: COMMERCIAL

## 2021-08-31 VITALS
OXYGEN SATURATION: 98 % | HEIGHT: 62 IN | BODY MASS INDEX: 19.32 KG/M2 | SYSTOLIC BLOOD PRESSURE: 105 MMHG | HEART RATE: 84 BPM | DIASTOLIC BLOOD PRESSURE: 71 MMHG | WEIGHT: 105 LBS | TEMPERATURE: 97.7 F

## 2021-08-31 PROCEDURE — 99024 POSTOP FOLLOW-UP VISIT: CPT

## 2021-09-21 ENCOUNTER — APPOINTMENT (OUTPATIENT)
Dept: PLASTIC SURGERY | Facility: CLINIC | Age: 31
End: 2021-09-21
Payer: COMMERCIAL

## 2021-09-21 VITALS
OXYGEN SATURATION: 99 % | HEART RATE: 78 BPM | SYSTOLIC BLOOD PRESSURE: 107 MMHG | BODY MASS INDEX: 19.88 KG/M2 | HEIGHT: 62 IN | DIASTOLIC BLOOD PRESSURE: 76 MMHG | WEIGHT: 108 LBS | RESPIRATION RATE: 16 BRPM | TEMPERATURE: 98.3 F

## 2021-09-21 DIAGNOSIS — L90.5 SCAR CONDITIONS AND FIBROSIS OF SKIN: ICD-10-CM

## 2021-09-21 PROCEDURE — 99024 POSTOP FOLLOW-UP VISIT: CPT

## 2021-10-04 ENCOUNTER — APPOINTMENT (OUTPATIENT)
Dept: OBGYN | Facility: CLINIC | Age: 31
End: 2021-10-04
Payer: COMMERCIAL

## 2021-10-04 ENCOUNTER — NON-APPOINTMENT (OUTPATIENT)
Age: 31
End: 2021-10-04

## 2021-10-04 VITALS
WEIGHT: 109.4 LBS | SYSTOLIC BLOOD PRESSURE: 110 MMHG | BODY MASS INDEX: 20.13 KG/M2 | HEIGHT: 62 IN | DIASTOLIC BLOOD PRESSURE: 82 MMHG

## 2021-10-04 PROCEDURE — 99213 OFFICE O/P EST LOW 20 MIN: CPT

## 2021-10-05 LAB
APPEARANCE: CLEAR
BILIRUBIN URINE: NEGATIVE
BLOOD URINE: NORMAL
COLOR: COLORLESS
GLUCOSE QUALITATIVE U: NEGATIVE
KETONES URINE: NEGATIVE
LEUKOCYTE ESTERASE URINE: NEGATIVE
NITRITE URINE: NEGATIVE
PH URINE: 6
PROTEIN URINE: NEGATIVE
SPECIFIC GRAVITY URINE: 1.01
UROBILINOGEN URINE: NORMAL

## 2021-10-07 ENCOUNTER — APPOINTMENT (OUTPATIENT)
Dept: OBGYN | Facility: CLINIC | Age: 31
End: 2021-10-07
Payer: COMMERCIAL

## 2021-10-07 VITALS
SYSTOLIC BLOOD PRESSURE: 108 MMHG | DIASTOLIC BLOOD PRESSURE: 70 MMHG | WEIGHT: 108 LBS | HEIGHT: 62 IN | BODY MASS INDEX: 19.88 KG/M2

## 2021-10-07 DIAGNOSIS — R10.2 PELVIC AND PERINEAL PAIN: ICD-10-CM

## 2021-10-07 PROCEDURE — 99213 OFFICE O/P EST LOW 20 MIN: CPT

## 2021-10-26 ENCOUNTER — APPOINTMENT (OUTPATIENT)
Dept: PLASTIC SURGERY | Facility: CLINIC | Age: 31
End: 2021-10-26
Payer: SELF-PAY

## 2021-10-26 VITALS
SYSTOLIC BLOOD PRESSURE: 110 MMHG | WEIGHT: 108 LBS | HEIGHT: 62 IN | DIASTOLIC BLOOD PRESSURE: 76 MMHG | RESPIRATION RATE: 14 BRPM | BODY MASS INDEX: 19.88 KG/M2 | OXYGEN SATURATION: 99 % | TEMPERATURE: 96.7 F | HEART RATE: 88 BPM

## 2021-10-26 DIAGNOSIS — Z98.86 PERSONAL HISTORY OF BREAST IMPLANT REMOVAL: ICD-10-CM

## 2021-10-26 DIAGNOSIS — L98.8 OTHER SPECIFIED DISORDERS OF THE SKIN AND SUBCUTANEOUS TISSUE: ICD-10-CM

## 2022-02-02 ENCOUNTER — NON-APPOINTMENT (OUTPATIENT)
Age: 32
End: 2022-02-02

## 2022-02-02 ENCOUNTER — APPOINTMENT (OUTPATIENT)
Dept: INTERNAL MEDICINE | Facility: CLINIC | Age: 32
End: 2022-02-02
Payer: COMMERCIAL

## 2022-02-02 VITALS
HEIGHT: 62 IN | TEMPERATURE: 97.2 F | WEIGHT: 106 LBS | SYSTOLIC BLOOD PRESSURE: 110 MMHG | HEART RATE: 63 BPM | RESPIRATION RATE: 11 BRPM | BODY MASS INDEX: 19.51 KG/M2 | DIASTOLIC BLOOD PRESSURE: 60 MMHG

## 2022-02-02 DIAGNOSIS — Z92.29 PERSONAL HISTORY OF OTHER DRUG THERAPY: ICD-10-CM

## 2022-02-02 DIAGNOSIS — Z01.818 ENCOUNTER FOR OTHER PREPROCEDURAL EXAMINATION: ICD-10-CM

## 2022-02-02 DIAGNOSIS — T85.44XA CAPSULAR CONTRACTURE OF BREAST IMPLANT, INITIAL ENCOUNTER: ICD-10-CM

## 2022-02-02 DIAGNOSIS — Z13.39 ENCOUNTER FOR SCREENING EXAM FOR OTHER MENTAL HEALTH AND BEHAVIORAL DISORDERS: ICD-10-CM

## 2022-02-02 DIAGNOSIS — Z87.898 PERSONAL HISTORY OF OTHER SPECIFIED CONDITIONS: ICD-10-CM

## 2022-02-02 DIAGNOSIS — Z20.822 CONTACT WITH AND (SUSPECTED) EXPOSURE TO COVID-19: ICD-10-CM

## 2022-02-02 DIAGNOSIS — R77.9 ABNORMALITY OF PLASMA PROTEIN, UNSPECIFIED: ICD-10-CM

## 2022-02-02 DIAGNOSIS — L85.3 XEROSIS CUTIS: ICD-10-CM

## 2022-02-02 DIAGNOSIS — Z86.39 PERSONAL HISTORY OF OTHER ENDOCRINE, NUTRITIONAL AND METABOLIC DISEASE: ICD-10-CM

## 2022-02-02 DIAGNOSIS — R79.89 OTHER SPECIFIED ABNORMAL FINDINGS OF BLOOD CHEMISTRY: ICD-10-CM

## 2022-02-02 DIAGNOSIS — Z87.2 PERSONAL HISTORY OF DISEASES OF THE SKIN AND SUBCUTANEOUS TISSUE: ICD-10-CM

## 2022-02-02 DIAGNOSIS — E04.1 NONTOXIC SINGLE THYROID NODULE: ICD-10-CM

## 2022-02-02 PROCEDURE — 36415 COLL VENOUS BLD VENIPUNCTURE: CPT

## 2022-02-02 PROCEDURE — G0444 DEPRESSION SCREEN ANNUAL: CPT | Mod: 59

## 2022-02-02 PROCEDURE — 93000 ELECTROCARDIOGRAM COMPLETE: CPT | Mod: 59

## 2022-02-02 PROCEDURE — 99385 PREV VISIT NEW AGE 18-39: CPT | Mod: 25

## 2022-02-02 PROCEDURE — G0442 ANNUAL ALCOHOL SCREEN 15 MIN: CPT

## 2022-02-02 RX ORDER — HYDROXYCHLOROQUINE SULFATE 200 MG/1
200 TABLET, FILM COATED ORAL
Refills: 0 | Status: ACTIVE | COMMUNITY

## 2022-02-02 RX ORDER — NORETHINDRONE ACETATE AND ETHINYL ESTRADIOL, ETHINYL ESTRADIOL AND FERROUS FUMARATE 1MG-10(24)
1 MG-10 MCG / KIT ORAL DAILY
Qty: 90 | Refills: 3 | Status: DISCONTINUED | COMMUNITY
Start: 2021-05-28 | End: 2022-02-02

## 2022-02-05 LAB
ALBUMIN SERPL ELPH-MCNC: 4.9 G/DL
ALP BLD-CCNC: 63 U/L
ALT SERPL-CCNC: 25 U/L
ANA PAT FLD IF-IMP: ABNORMAL
ANA SER IF-ACNC: ABNORMAL
ANION GAP SERPL CALC-SCNC: 14 MMOL/L
APPEARANCE: CLEAR
AST SERPL-CCNC: 21 U/L
BACTERIA: NEGATIVE
BASOPHILS # BLD AUTO: 0.07 K/UL
BASOPHILS NFR BLD AUTO: 1 %
BILIRUB SERPL-MCNC: 1.2 MG/DL
BILIRUBIN URINE: NEGATIVE
BLOOD URINE: ABNORMAL
BUN SERPL-MCNC: 10 MG/DL
CALCIUM SERPL-MCNC: 9.7 MG/DL
CALCIUM SERPL-MCNC: 9.7 MG/DL
CHLORIDE SERPL-SCNC: 103 MMOL/L
CHOLEST SERPL-MCNC: 191 MG/DL
CO2 SERPL-SCNC: 25 MMOL/L
COLOR: NORMAL
CREAT SERPL-MCNC: 0.53 MG/DL
CRP SERPL-MCNC: <3 MG/L
DSDNA AB SER-ACNC: 760 IU/ML
EOSINOPHIL # BLD AUTO: 0.08 K/UL
EOSINOPHIL NFR BLD AUTO: 1.1 %
ERYTHROCYTE [SEDIMENTATION RATE] IN BLOOD BY WESTERGREN METHOD: 6 MM/HR
FERRITIN SERPL-MCNC: 86 NG/ML
GLUCOSE QUALITATIVE U: NEGATIVE
GLUCOSE SERPL-MCNC: 85 MG/DL
HCT VFR BLD CALC: 43.7 %
HDLC SERPL-MCNC: 82 MG/DL
HGB BLD-MCNC: 14.9 G/DL
HYALINE CASTS: 0 /LPF
IMM GRANULOCYTES NFR BLD AUTO: 0.3 %
IRON SATN MFR SERPL: 28 %
IRON SERPL-MCNC: 86 UG/DL
KETONES URINE: NEGATIVE
LDLC SERPL CALC-MCNC: 98 MG/DL
LEUKOCYTE ESTERASE URINE: NEGATIVE
LYMPHOCYTES # BLD AUTO: 2.42 K/UL
LYMPHOCYTES NFR BLD AUTO: 33.8 %
MAN DIFF?: NORMAL
MCHC RBC-ENTMCNC: 31.2 PG
MCHC RBC-ENTMCNC: 34.1 GM/DL
MCV RBC AUTO: 91.6 FL
MICROSCOPIC-UA: NORMAL
MONOCYTES # BLD AUTO: 0.33 K/UL
MONOCYTES NFR BLD AUTO: 4.6 %
NEUTROPHILS # BLD AUTO: 4.23 K/UL
NEUTROPHILS NFR BLD AUTO: 59.2 %
NITRITE URINE: NEGATIVE
NONHDLC SERPL-MCNC: 109 MG/DL
PARATHYROID HORMONE INTACT: 56 PG/ML
PH URINE: 5.5
PLATELET # BLD AUTO: 348 K/UL
POTASSIUM SERPL-SCNC: 4 MMOL/L
PROT SERPL-MCNC: 7.3 G/DL
PROTEIN URINE: NORMAL
RBC # BLD: 4.77 M/UL
RBC # FLD: 12.1 %
RED BLOOD CELLS URINE: 3 /HPF
SODIUM SERPL-SCNC: 141 MMOL/L
SPECIFIC GRAVITY URINE: 1.01
SQUAMOUS EPITHELIAL CELLS: 1 /HPF
T4 FREE SERPL-MCNC: 1.3 NG/DL
TIBC SERPL-MCNC: 306 UG/DL
TRIGL SERPL-MCNC: 55 MG/DL
TSH SERPL-ACNC: 1.56 UIU/ML
UIBC SERPL-MCNC: 220 UG/DL
UROBILINOGEN URINE: NORMAL
WBC # FLD AUTO: 7.15 K/UL
WHITE BLOOD CELLS URINE: 3 /HPF

## 2022-02-07 ENCOUNTER — NON-APPOINTMENT (OUTPATIENT)
Age: 32
End: 2022-02-07

## 2022-04-08 ENCOUNTER — NON-APPOINTMENT (OUTPATIENT)
Age: 32
End: 2022-04-08

## 2022-06-20 ENCOUNTER — APPOINTMENT (OUTPATIENT)
Dept: INTERNAL MEDICINE | Facility: CLINIC | Age: 32
End: 2022-06-20
Payer: COMMERCIAL

## 2022-06-20 ENCOUNTER — LABORATORY RESULT (OUTPATIENT)
Age: 32
End: 2022-06-20

## 2022-06-20 VITALS
SYSTOLIC BLOOD PRESSURE: 110 MMHG | BODY MASS INDEX: 20.43 KG/M2 | HEIGHT: 62 IN | OXYGEN SATURATION: 99 % | HEART RATE: 97 BPM | DIASTOLIC BLOOD PRESSURE: 70 MMHG | TEMPERATURE: 97 F | WEIGHT: 111 LBS | RESPIRATION RATE: 10 BRPM

## 2022-06-20 DIAGNOSIS — F41.9 ANXIETY DISORDER, UNSPECIFIED: ICD-10-CM

## 2022-06-20 DIAGNOSIS — R53.83 OTHER FATIGUE: ICD-10-CM

## 2022-06-20 DIAGNOSIS — D72.829 ELEVATED WHITE BLOOD CELL COUNT, UNSPECIFIED: ICD-10-CM

## 2022-06-20 DIAGNOSIS — O03.9 COMPLETE OR UNSPECIFIED SPONTANEOUS ABORTION W/OUT COMPLICATION: ICD-10-CM

## 2022-06-20 PROCEDURE — 36415 COLL VENOUS BLD VENIPUNCTURE: CPT

## 2022-06-20 PROCEDURE — 99214 OFFICE O/P EST MOD 30 MIN: CPT | Mod: 25

## 2022-06-20 RX ORDER — ASPIRIN ENTERIC COATED TABLETS 81 MG 81 MG/1
81 TABLET, DELAYED RELEASE ORAL
Qty: 1 | Refills: 1 | Status: ACTIVE | COMMUNITY
Start: 2022-06-20

## 2022-06-20 RX ORDER — MELOXICAM 15 MG/1
15 TABLET ORAL
Qty: 90 | Refills: 2 | Status: DISCONTINUED | COMMUNITY
Start: 2018-10-10 | End: 2022-06-20

## 2022-06-21 ENCOUNTER — NON-APPOINTMENT (OUTPATIENT)
Age: 32
End: 2022-06-21

## 2022-06-21 LAB
ALBUMIN SERPL ELPH-MCNC: 4.8 G/DL
ALP BLD-CCNC: 56 U/L
ALT SERPL-CCNC: 19 U/L
ANION GAP SERPL CALC-SCNC: 12 MMOL/L
AST SERPL-CCNC: 21 U/L
BASOPHILS # BLD AUTO: 0.07 K/UL
BASOPHILS NFR BLD AUTO: 1 %
BILIRUB SERPL-MCNC: 1 MG/DL
BUN SERPL-MCNC: 10 MG/DL
CALCIUM SERPL-MCNC: 9.8 MG/DL
CHLORIDE SERPL-SCNC: 104 MMOL/L
CO2 SERPL-SCNC: 26 MMOL/L
CREAT SERPL-MCNC: 0.63 MG/DL
EGFR: 122 ML/MIN/1.73M2
EOSINOPHIL # BLD AUTO: 0.06 K/UL
EOSINOPHIL NFR BLD AUTO: 0.8 %
GLUCOSE SERPL-MCNC: 83 MG/DL
HCT VFR BLD CALC: 41.7 %
HGB BLD-MCNC: 13.9 G/DL
IMM GRANULOCYTES NFR BLD AUTO: 0.1 %
LYMPHOCYTES # BLD AUTO: 2.11 K/UL
LYMPHOCYTES NFR BLD AUTO: 29.9 %
MAN DIFF?: NORMAL
MCHC RBC-ENTMCNC: 31.6 PG
MCHC RBC-ENTMCNC: 33.3 GM/DL
MCV RBC AUTO: 94.8 FL
MONOCYTES # BLD AUTO: 0.32 K/UL
MONOCYTES NFR BLD AUTO: 4.5 %
NEUTROPHILS # BLD AUTO: 4.49 K/UL
NEUTROPHILS NFR BLD AUTO: 63.7 %
PLATELET # BLD AUTO: 343 K/UL
POTASSIUM SERPL-SCNC: 4.7 MMOL/L
PROT SERPL-MCNC: 7 G/DL
RBC # BLD: 4.4 M/UL
RBC # FLD: 12.5 %
SODIUM SERPL-SCNC: 143 MMOL/L
TSH SERPL-ACNC: 0.94 UIU/ML
WBC # FLD AUTO: 7.06 K/UL

## 2022-12-13 NOTE — ED ADULT TRIAGE NOTE - HEIGHT IN FEET
5
Initiate Treatment: Restart clobetasol scalp solution apply to scalp bid for 2 weeks then weekend only \\nIlumya 100mg injected SC at week 0, 4 then every 12 weeks thereafter
Detail Level: Zone
Render In Strict Bullet Format?: No

## 2023-02-09 ENCOUNTER — NON-APPOINTMENT (OUTPATIENT)
Age: 33
End: 2023-02-09

## 2023-02-09 ENCOUNTER — APPOINTMENT (OUTPATIENT)
Dept: INTERNAL MEDICINE | Facility: CLINIC | Age: 33
End: 2023-02-09
Payer: COMMERCIAL

## 2023-02-09 VITALS
HEIGHT: 62 IN | BODY MASS INDEX: 23.92 KG/M2 | OXYGEN SATURATION: 99 % | RESPIRATION RATE: 16 BRPM | WEIGHT: 130 LBS | SYSTOLIC BLOOD PRESSURE: 108 MMHG | DIASTOLIC BLOOD PRESSURE: 65 MMHG | HEART RATE: 83 BPM

## 2023-02-09 DIAGNOSIS — Z13.31 ENCOUNTER FOR SCREENING FOR DEPRESSION: ICD-10-CM

## 2023-02-09 DIAGNOSIS — Z86.39 PERSONAL HISTORY OF OTHER ENDOCRINE, NUTRITIONAL AND METABOLIC DISEASE: ICD-10-CM

## 2023-02-09 DIAGNOSIS — Z00.00 ENCOUNTER FOR GENERAL ADULT MEDICAL EXAMINATION W/OUT ABNORMAL FINDINGS: ICD-10-CM

## 2023-02-09 DIAGNOSIS — Z23 ENCOUNTER FOR IMMUNIZATION: ICD-10-CM

## 2023-02-09 PROCEDURE — 93000 ELECTROCARDIOGRAM COMPLETE: CPT | Mod: 59

## 2023-02-09 PROCEDURE — G0444 DEPRESSION SCREEN ANNUAL: CPT | Mod: 59

## 2023-02-09 PROCEDURE — 36415 COLL VENOUS BLD VENIPUNCTURE: CPT

## 2023-02-09 PROCEDURE — 99395 PREV VISIT EST AGE 18-39: CPT | Mod: 25

## 2023-02-11 LAB
ALBUMIN SERPL ELPH-MCNC: 3.9 G/DL
ALP BLD-CCNC: 99 U/L
ALT SERPL-CCNC: 39 U/L
ANION GAP SERPL CALC-SCNC: 12 MMOL/L
APPEARANCE: CLEAR
AST SERPL-CCNC: 25 U/L
BACTERIA: NEGATIVE
BASOPHILS # BLD AUTO: 0.04 K/UL
BASOPHILS NFR BLD AUTO: 0.4 %
BILIRUB SERPL-MCNC: 0.6 MG/DL
BILIRUBIN URINE: NEGATIVE
BLOOD URINE: NEGATIVE
BUN SERPL-MCNC: 6 MG/DL
CALCIUM SERPL-MCNC: 9.1 MG/DL
CALCIUM SERPL-MCNC: 9.1 MG/DL
CHLORIDE SERPL-SCNC: 104 MMOL/L
CHOLEST SERPL-MCNC: 271 MG/DL
CO2 SERPL-SCNC: 22 MMOL/L
COLOR: COLORLESS
CREAT SERPL-MCNC: 0.46 MG/DL
EGFR: 130 ML/MIN/1.73M2
EOSINOPHIL # BLD AUTO: 0.05 K/UL
EOSINOPHIL NFR BLD AUTO: 0.6 %
FERRITIN SERPL-MCNC: 21 NG/ML
GLUCOSE QUALITATIVE U: NEGATIVE
GLUCOSE SERPL-MCNC: 73 MG/DL
HCT VFR BLD CALC: 36.7 %
HDLC SERPL-MCNC: 105 MG/DL
HGB BLD-MCNC: 12.7 G/DL
HYALINE CASTS: 0 /LPF
IMM GRANULOCYTES NFR BLD AUTO: 0.9 %
IRON SATN MFR SERPL: 18 %
IRON SERPL-MCNC: 81 UG/DL
KETONES URINE: NEGATIVE
LDLC SERPL CALC-MCNC: 142 MG/DL
LEUKOCYTE ESTERASE URINE: ABNORMAL
LYMPHOCYTES # BLD AUTO: 1.6 K/UL
LYMPHOCYTES NFR BLD AUTO: 17.6 %
MAN DIFF?: NORMAL
MCHC RBC-ENTMCNC: 33.8 PG
MCHC RBC-ENTMCNC: 34.6 GM/DL
MCV RBC AUTO: 97.6 FL
MICROSCOPIC-UA: NORMAL
MONOCYTES # BLD AUTO: 0.52 K/UL
MONOCYTES NFR BLD AUTO: 5.7 %
NEUTROPHILS # BLD AUTO: 6.8 K/UL
NEUTROPHILS NFR BLD AUTO: 74.8 %
NITRITE URINE: NEGATIVE
NONHDLC SERPL-MCNC: 166 MG/DL
PARATHYROID HORMONE INTACT: 24 PG/ML
PH URINE: 7.5
PLATELET # BLD AUTO: 194 K/UL
POTASSIUM SERPL-SCNC: 4.6 MMOL/L
PROT SERPL-MCNC: 6.2 G/DL
PROTEIN URINE: NEGATIVE
RBC # BLD: 3.76 M/UL
RBC # FLD: 13.2 %
RED BLOOD CELLS URINE: 2 /HPF
SODIUM SERPL-SCNC: 139 MMOL/L
SPECIFIC GRAVITY URINE: 1
SQUAMOUS EPITHELIAL CELLS: 1 /HPF
TIBC SERPL-MCNC: 456 UG/DL
TRIGL SERPL-MCNC: 118 MG/DL
UIBC SERPL-MCNC: 375 UG/DL
UROBILINOGEN URINE: NORMAL
WBC # FLD AUTO: 9.09 K/UL
WHITE BLOOD CELLS URINE: 3 /HPF

## 2023-02-13 ENCOUNTER — TRANSCRIPTION ENCOUNTER (OUTPATIENT)
Age: 33
End: 2023-02-13

## 2023-07-12 ENCOUNTER — APPOINTMENT (OUTPATIENT)
Dept: INTERNAL MEDICINE | Facility: CLINIC | Age: 33
End: 2023-07-12
Payer: COMMERCIAL

## 2023-07-12 VITALS
BODY MASS INDEX: 20.24 KG/M2 | OXYGEN SATURATION: 99 % | WEIGHT: 110 LBS | HEIGHT: 62 IN | HEART RATE: 75 BPM | RESPIRATION RATE: 12 BRPM | DIASTOLIC BLOOD PRESSURE: 70 MMHG | SYSTOLIC BLOOD PRESSURE: 110 MMHG

## 2023-07-12 DIAGNOSIS — R21 RASH AND OTHER NONSPECIFIC SKIN ERUPTION: ICD-10-CM

## 2023-07-12 PROCEDURE — 99213 OFFICE O/P EST LOW 20 MIN: CPT

## 2023-07-12 RX ORDER — ALPRAZOLAM 0.25 MG/1
0.25 TABLET ORAL
Qty: 20 | Refills: 0 | Status: DISCONTINUED | COMMUNITY
Start: 2022-06-20 | End: 2023-07-12

## 2023-10-10 RX ORDER — HYDROXYCHLOROQUINE SULFATE 200 MG
1 TABLET ORAL
Qty: 0 | Refills: 0 | DISCHARGE

## 2023-10-16 PROBLEM — M32.9 SYSTEMIC LUPUS ERYTHEMATOSUS, UNSPECIFIED: Chronic | Status: ACTIVE | Noted: 2018-10-15

## 2023-10-19 ENCOUNTER — OUTPATIENT (OUTPATIENT)
Dept: OUTPATIENT SERVICES | Facility: HOSPITAL | Age: 33
LOS: 1 days | End: 2023-10-19

## 2023-10-19 ENCOUNTER — APPOINTMENT (OUTPATIENT)
Dept: ULTRASOUND IMAGING | Facility: CLINIC | Age: 33
End: 2023-10-19
Payer: COMMERCIAL

## 2023-10-19 DIAGNOSIS — Z98.890 OTHER SPECIFIED POSTPROCEDURAL STATES: Chronic | ICD-10-CM

## 2023-10-19 DIAGNOSIS — Z98.82 BREAST IMPLANT STATUS: Chronic | ICD-10-CM

## 2023-10-19 DIAGNOSIS — E04.1 NONTOXIC SINGLE THYROID NODULE: ICD-10-CM

## 2023-10-19 PROCEDURE — 76536 US EXAM OF HEAD AND NECK: CPT | Mod: 26

## 2024-01-01 NOTE — CONSULT NOTE ADULT - PROBLEM/RECOMMENDATION-3
LUIS ARMANDO NOLEN; First Name: ____Liliana__      GA 31.2 weeks;     Age: 25d;   PMA: 34.6  BW:  1590   MRN: [id number]    COURSE:  31 weeks, RDS, immature thermoregulation, Mother with PEC, apnea of prematurity,  s/p hyperbili requiring photoRx,     INTERVAL EVENTS: No acute events, OC    Weight (g): 2115 +45     Intake (ml/kg/day): 156  Urine output (ml/kg/hr or frequency): x 8  Stools (frequency):  x 7  Other:     Growth: 1/23    HC (cm): 27.8 ( 2%)      Length (45m):  43 % __38____ .  Weight %  34____ ; ADWG (g/day)  ___35__ .   (Growth chart used Carolyn_____ ) .  *******************************************************  Respiratory: RDS; Room Air since 1/11 Continuous cardiorespiratory monitoring for risk of apnea of prematurity and associated bradycardia.   ·	Off caffeine on 1/14.    ·	S/p CPAP 1/11    CV: Hemodynamically stable.  Observe for signs of PDA as PVR falls. Soft murmur resolved    ACCESS: none  ·	s/p PICC line LUE placed 1/3-1/11    FEN:  [id number] @ 42 ml PO/OG Q3H (163 ml/kg) over 30 min, PO = 76% MVI/iron. Will go home on HMF  ·	S/p Initial hypoglycemia resolved with IV fluids    Heme: AB+/DAKSHA neg, thrombocytosis-->plt 608 pm 1/12, unclear etiology, continue to monitor.  Anemia of prematurity, Hct 39 retic 1.2 on 1/22 on Fe  ·	 S/p Phototherapy (1/5-1/8) for hyperbilirubinemia due to prematurity.       ID: Monitor for signs and symptoms of sepsis.  Born for maternal indications.  No antibiotics at birth. Parents agree to Beyfortus, approved by med director, give 1 day PTD    Neuro: At risk for IVH/PVL. Serial HUS at 1 week 1/12: left germinal matrix hemorrhage, 1 month, and term-equivalent.  NDE -requested 1/22, no show 1/24      Thermal:  open crib 1/21 pm  ·	s/p Temp 38.9 on 1/8 which resolved without meds    Meds: PVS, Fe    Social: Family updated 1/27 [place]    Labs/Imaging/Studies:        This patient requires ICU care including continuous monitoring and frequent vital sign assessment due to significant risk of cardiorespiratory compromise or decompensation outside of the NICU.       DISPLAY PLAN FREE TEXT

## 2024-04-02 NOTE — ED ADULT NURSE NOTE - ED CARDIAC RHYTHM
History of cardiac catheterization 06/2022 with PCI of proximal > mLAD, large lateral OM1  On Coreg, Lipitor, Zetia  Denies any CP/CD   regular

## 2024-04-18 ENCOUNTER — APPOINTMENT (OUTPATIENT)
Dept: INTERNAL MEDICINE | Facility: CLINIC | Age: 34
End: 2024-04-18
Payer: COMMERCIAL

## 2024-04-18 VITALS
SYSTOLIC BLOOD PRESSURE: 120 MMHG | RESPIRATION RATE: 11 BRPM | WEIGHT: 122 LBS | DIASTOLIC BLOOD PRESSURE: 80 MMHG | OXYGEN SATURATION: 97 % | HEIGHT: 62 IN | HEART RATE: 98 BPM | BODY MASS INDEX: 22.45 KG/M2

## 2024-04-18 DIAGNOSIS — J06.9 ACUTE UPPER RESPIRATORY INFECTION, UNSPECIFIED: ICD-10-CM

## 2024-04-18 DIAGNOSIS — Z11.59 ENCOUNTER FOR SCREENING FOR OTHER VIRAL DISEASES: ICD-10-CM

## 2024-04-18 PROCEDURE — G2211 COMPLEX E/M VISIT ADD ON: CPT | Mod: NC,1L

## 2024-04-18 PROCEDURE — 99213 OFFICE O/P EST LOW 20 MIN: CPT

## 2024-04-18 RX ORDER — ENOXAPARIN SODIUM 100 MG/ML
100 INJECTION SUBCUTANEOUS
Refills: 0 | Status: ACTIVE | COMMUNITY
Start: 2024-04-18

## 2024-04-18 NOTE — PHYSICAL EXAM
[Normal Outer Ear/Nose] : the outer ears and nose were normal in appearance [Normal Oropharynx] : the oropharynx was normal [Normal TMs] : both tympanic membranes were normal [Normal Nasal Mucosa] : the nasal mucosa was normal [Supple] : supple

## 2024-04-18 NOTE — ASSESSMENT
[FreeTextEntry1] : Etiology likely viral, viral swab sent.Patient advised to rest/increase fluids and use supportive therapy. Patient will call if symptoms persist or worsen and return to the office as scheduled for regular followup.  Dr. Youssef was present in office building while I examined patient

## 2024-04-18 NOTE — HISTORY OF PRESENT ILLNESS
[FreeTextEntry8] : Patient is 26 weeks pregnant  Patient presents complaining of not feeling well for 3 to 4 days.  Patient is complaining of cough that is productive, she feels it in her chest and not her throat.  Patient denies fever/dyspnea.  Patient reports home COVID test as negative.  Patient's son has a similar illness.

## 2024-04-19 LAB
INFLUENZA A RESULT: NOT DETECTED
INFLUENZA B RESULT: NOT DETECTED
RESP SYN VIRUS RESULT: NOT DETECTED
SARS-COV-2 RESULT: NOT DETECTED

## 2024-04-23 ENCOUNTER — NON-APPOINTMENT (OUTPATIENT)
Age: 34
End: 2024-04-23

## 2024-04-23 ENCOUNTER — APPOINTMENT (OUTPATIENT)
Dept: INTERNAL MEDICINE | Facility: CLINIC | Age: 34
End: 2024-04-23
Payer: COMMERCIAL

## 2024-04-23 VITALS
SYSTOLIC BLOOD PRESSURE: 106 MMHG | OXYGEN SATURATION: 98 % | HEART RATE: 79 BPM | BODY MASS INDEX: 22.45 KG/M2 | DIASTOLIC BLOOD PRESSURE: 60 MMHG | WEIGHT: 122 LBS | RESPIRATION RATE: 12 BRPM | HEIGHT: 62 IN

## 2024-04-23 DIAGNOSIS — Z13.6 ENCOUNTER FOR SCREENING FOR CARDIOVASCULAR DISORDERS: ICD-10-CM

## 2024-04-23 DIAGNOSIS — Z14.8 GENETIC CARRIER OF OTHER DISEASE: ICD-10-CM

## 2024-04-23 DIAGNOSIS — R94.31 ABNORMAL ELECTROCARDIOGRAM [ECG] [EKG]: ICD-10-CM

## 2024-04-23 DIAGNOSIS — Z00.00 ENCOUNTER FOR GENERAL ADULT MEDICAL EXAMINATION W/OUT ABNORMAL FINDINGS: ICD-10-CM

## 2024-04-23 DIAGNOSIS — M32.9 SYSTEMIC LUPUS ERYTHEMATOSUS, UNSPECIFIED: ICD-10-CM

## 2024-04-23 PROCEDURE — 36415 COLL VENOUS BLD VENIPUNCTURE: CPT

## 2024-04-23 PROCEDURE — 93000 ELECTROCARDIOGRAM COMPLETE: CPT

## 2024-04-23 PROCEDURE — 99395 PREV VISIT EST AGE 18-39: CPT

## 2024-04-23 RX ORDER — AMOXICILLIN 875 MG/1
875 TABLET, FILM COATED ORAL
Qty: 20 | Refills: 0 | Status: ACTIVE | COMMUNITY
Start: 2024-04-23 | End: 1900-01-01

## 2024-04-23 NOTE — HISTORY OF PRESENT ILLNESS
[de-identified] : 33-year-old female presents for her annual wellness visit. She was seen for the first time in this office February 2022.  Medical history includes: -SLE diagnosed in 2018 with symptoms having started about 2015. She was started in 2018 on Plaquenil with much improvement of her symptoms and continues on this treatment. She continues to follow with rheumatology.  -Hyperparathyroidism with partial parathyroidectomy January 2021 1 of 4 glands. She states since then her calcium has been normal.  -Thyroid nodules.  Thyroid ultrasound October 2023 with stable right nodule with recommended follow-up in 1 year  -Patient had COVID December 2021 without significant symptoms and unfortunately also suffered a miscarriage in December.  Unfortunately she had another miscarriage June 2022 but is was able to get pregnant and had a son Lionel born April 2023.  She is now pregnant again with her second son who is due July 2024!  -Hemochromatosis carrier  No cardiopulmonary, hepatorenal, hematological or diabetes history. Overall feeling well without complaints including chest pain, palpitations, shortness of breath or edema. She has had a URI for about 1 week and was seen here on April 18 feeling the patient had a viral URI and symptomatic treatment recommended but symptoms continue including sinus pressure and congestion.  Treatment is limited secondary to pregnancy.  Overall the patient is feeling well without any complaints of chest pain, palpitations, shortness of breath or edema. 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

## 2024-04-23 NOTE — PHYSICAL EXAM
[Normal Mood] : the mood was normal [No Acute Distress] : no acute distress [Well Nourished] : well nourished [Well Developed] : well developed [Well-Appearing] : well-appearing [Normal Sclera/Conjunctiva] : normal sclera/conjunctiva [PERRL] : pupils equal round and reactive to light [EOMI] : extraocular movements intact [Normal Outer Ear/Nose] : the outer ears and nose were normal in appearance [Normal Oropharynx] : the oropharynx was normal [No JVD] : no jugular venous distention [No Lymphadenopathy] : no lymphadenopathy [Supple] : supple [Thyroid Normal, No Nodules] : the thyroid was normal and there were no nodules present [No Respiratory Distress] : no respiratory distress  [No Accessory Muscle Use] : no accessory muscle use [Clear to Auscultation] : lungs were clear to auscultation bilaterally [Normal Rate] : normal rate  [Regular Rhythm] : with a regular rhythm [Normal S1, S2] : normal S1 and S2 [No Murmur] : no murmur heard [No Carotid Bruits] : no carotid bruits [No Abdominal Bruit] : a ~M bruit was not heard ~T in the abdomen [No Varicosities] : no varicosities [Pedal Pulses Present] : the pedal pulses are present [No Edema] : there was no peripheral edema [No Palpable Aorta] : no palpable aorta [No Extremity Clubbing/Cyanosis] : no extremity clubbing/cyanosis [Soft] : abdomen soft [Non Tender] : non-tender [Non-distended] : non-distended [No Masses] : no abdominal mass palpated [No HSM] : no HSM [Normal Bowel Sounds] : normal bowel sounds [No CVA Tenderness] : no CVA  tenderness [No Spinal Tenderness] : no spinal tenderness [No Joint Swelling] : no joint swelling [Grossly Normal Strength/Tone] : grossly normal strength/tone [No Rash] : no rash [Coordination Grossly Intact] : coordination grossly intact [No Focal Deficits] : no focal deficits [Normal Gait] : normal gait [Deep Tendon Reflexes (DTR)] : deep tendon reflexes were 2+ and symmetric [Normal Affect] : the affect was normal [Normal Insight/Judgement] : insight and judgment were intact [de-identified] : gravid

## 2024-04-23 NOTE — HEALTH RISK ASSESSMENT
[No] : In the past 12 months have you used drugs other than those required for medical reasons? No [No falls in past year] : Patient reported no falls in the past year [0] : 2) Feeling down, depressed, or hopeless: Not at all (0) [PHQ-2 Negative - No further assessment needed] : PHQ-2 Negative - No further assessment needed [None] : None [With Significant Other] : lives with significant other [] :  [Fully functional (bathing, dressing, toileting, transferring, walking, feeding)] : Fully functional (bathing, dressing, toileting, transferring, walking, feeding) [Fully functional (using the telephone, shopping, preparing meals, housekeeping, doing laundry, using] : Fully functional and needs no help or supervision to perform IADLs (using the telephone, shopping, preparing meals, housekeeping, doing laundry, using transportation, managing medications and managing finances) [Smoke Detector] : smoke detector [Carbon Monoxide Detector] : carbon monoxide detector [Seat Belt] :  uses seat belt [Sunscreen] : uses sunscreen [Reviewed no changes] : Reviewed, no changes [Designated Healthcare Proxy] : Designated healthcare proxy [Name: ___] : Health Care Proxy's Name: [unfilled]  [Relationship: ___] : Relationship: [unfilled] [Never] : Never [Good] : ~his/her~  mood as  good [Audit-CScore] : 0 [de-identified] : occ exercise [de-identified] : good [[id number]] : 0 [Change in mental status noted] : No change in mental status noted [Reports changes in hearing] : Reports no changes in hearing [Reports changes in vision] : Reports no changes in vision [Reports changes in dental health] : Reports no changes in dental health [Guns at Home] : no guns at home [AdvancecareDate] : 04/24

## 2024-04-23 NOTE — ASSESSMENT
[FreeTextEntry1] : 33-year-old female with lupus diagnosed in 2018 maintained on plaque with no with good benefit. Patient follows up with rheumatology and ophthalmology.  Patient with EKG showing T wave inversions that are new in leads III and V3 of questionable significance but definite change therefore referred to cardiology.  Hyperparathyroidism status post partial thyroidectomy 1 of 4 glands with normal calcium since. Calcium along with parathyroid hormone will be checked today blood work.  Thyroid nodules with ultrasound October 2023 stable  Status post miscarriage December 2021 and June 2022 but was able to get pregnant and has a son born April 2023 and is now pregnant again with the son due July 2024.  On aspirin and Lovenox with pregnancy.  Hemochromatosis carrier therefore iron studies will be checked with today's blood work.  Patient with persistent URI with possible sinus infection therefore treat with amoxicillin 875 twice a day for 10 days.  Also symptomatic treatment with steam, fluids, and guaifenesin if okay with OB.  Patient already received influenza and COVID vaccines Tdap February 2023  Venipuncture done today in the office Followup yearly and as needed

## 2024-04-24 LAB
ALBUMIN SERPL ELPH-MCNC: 3.6 G/DL
ALP BLD-CCNC: 96 U/L
ALT SERPL-CCNC: 13 U/L
ANION GAP SERPL CALC-SCNC: 12 MMOL/L
APPEARANCE: CLEAR
AST SERPL-CCNC: 15 U/L
BACTERIA: NEGATIVE /HPF
BASOPHILS # BLD AUTO: 0.04 K/UL
BASOPHILS NFR BLD AUTO: 0.4 %
BILIRUB SERPL-MCNC: 0.7 MG/DL
BILIRUBIN URINE: NEGATIVE
BLOOD URINE: NEGATIVE
BUN SERPL-MCNC: 6 MG/DL
CALCIUM SERPL-MCNC: 8.9 MG/DL
CAST: 0 /LPF
CHLORIDE SERPL-SCNC: 104 MMOL/L
CHOLEST SERPL-MCNC: 263 MG/DL
CO2 SERPL-SCNC: 21 MMOL/L
COLOR: YELLOW
CREAT SERPL-MCNC: 0.48 MG/DL
EGFR: 128 ML/MIN/1.73M2
EOSINOPHIL # BLD AUTO: 0.03 K/UL
EOSINOPHIL NFR BLD AUTO: 0.3 %
EPITHELIAL CELLS: 8 /HPF
FERRITIN SERPL-MCNC: 12 NG/ML
GLUCOSE QUALITATIVE U: NEGATIVE MG/DL
GLUCOSE SERPL-MCNC: 74 MG/DL
HCT VFR BLD CALC: 36.3 %
HDLC SERPL-MCNC: 86 MG/DL
HGB BLD-MCNC: 12.5 G/DL
IMM GRANULOCYTES NFR BLD AUTO: 0.5 %
IRON SATN MFR SERPL: 6 %
IRON SERPL-MCNC: 30 UG/DL
KETONES URINE: NEGATIVE MG/DL
LDLC SERPL CALC-MCNC: 149 MG/DL
LEUKOCYTE ESTERASE URINE: ABNORMAL
LYMPHOCYTES # BLD AUTO: 1.71 K/UL
LYMPHOCYTES NFR BLD AUTO: 15.7 %
MAN DIFF?: NORMAL
MCHC RBC-ENTMCNC: 31.6 PG
MCHC RBC-ENTMCNC: 34.4 GM/DL
MCV RBC AUTO: 91.7 FL
MICROSCOPIC-UA: NORMAL
MONOCYTES # BLD AUTO: 0.65 K/UL
MONOCYTES NFR BLD AUTO: 6 %
NEUTROPHILS # BLD AUTO: 8.43 K/UL
NEUTROPHILS NFR BLD AUTO: 77.1 %
NITRITE URINE: NEGATIVE
NONHDLC SERPL-MCNC: 177 MG/DL
PH URINE: 6.5
PLATELET # BLD AUTO: 230 K/UL
POTASSIUM SERPL-SCNC: 4.3 MMOL/L
PROT SERPL-MCNC: 5.9 G/DL
PROTEIN URINE: NEGATIVE MG/DL
RBC # BLD: 3.96 M/UL
RBC # FLD: 13.2 %
RED BLOOD CELLS URINE: 0 /HPF
REVIEW: NORMAL
SODIUM SERPL-SCNC: 138 MMOL/L
SPECIFIC GRAVITY URINE: <1.005
TIBC SERPL-MCNC: 506 UG/DL
TRIGL SERPL-MCNC: 156 MG/DL
UIBC SERPL-MCNC: 476 UG/DL
UROBILINOGEN URINE: 0.2 MG/DL
WBC # FLD AUTO: 10.91 K/UL
WHITE BLOOD CELLS URINE: 2 /HPF

## 2024-04-25 ENCOUNTER — NON-APPOINTMENT (OUTPATIENT)
Age: 34
End: 2024-04-25

## 2024-04-25 ENCOUNTER — APPOINTMENT (OUTPATIENT)
Dept: CARDIOLOGY | Facility: CLINIC | Age: 34
End: 2024-04-25
Payer: COMMERCIAL

## 2024-04-25 VITALS
HEART RATE: 109 BPM | BODY MASS INDEX: 21.35 KG/M2 | HEIGHT: 62 IN | OXYGEN SATURATION: 98 % | SYSTOLIC BLOOD PRESSURE: 90 MMHG | DIASTOLIC BLOOD PRESSURE: 60 MMHG | WEIGHT: 116 LBS

## 2024-04-25 DIAGNOSIS — R00.2 PALPITATIONS: ICD-10-CM

## 2024-04-25 DIAGNOSIS — E78.5 HYPERLIPIDEMIA, UNSPECIFIED: ICD-10-CM

## 2024-04-25 PROCEDURE — 99204 OFFICE O/P NEW MOD 45 MIN: CPT | Mod: 25

## 2024-04-25 PROCEDURE — 93000 ELECTROCARDIOGRAM COMPLETE: CPT

## 2024-04-25 PROCEDURE — G2211 COMPLEX E/M VISIT ADD ON: CPT | Mod: NC,1L

## 2024-04-25 NOTE — DISCUSSION/SUMMARY
[FreeTextEntry1] : 33F h/o SLE (on Plaquenil),  currently 3rd trimester pregnancy had recent annual physical with EKG done found with new T-wave inversion V1-V3, presents for cardiology evaluation.   Nonspecific T-wave pattern likely normal variant, repeat EKG today less prominent T-wave inversion, chronic intermittent palpitations likely nonarrhythmic related otherwise no other cardiac symptoms and low risk factors for CAD, will repeat Echocardiogram to reassess structural heart function.   HLD- serially borderline high for 2 years but given LDL <190 with ASCVD risk <7.5% can continue diet control.   SLE- on Plaquenil and LMWH.   3rd trimester pregnancy- on low dose ASA for primary prevention of preeclampsia.     Follow up as needed if Echocardiogram is normal.  [EKG obtained to assist in diagnosis and management of assessed problem(s)] : EKG obtained to assist in diagnosis and management of assessed problem(s)

## 2024-04-25 NOTE — HISTORY OF PRESENT ILLNESS
[FreeTextEntry1] : 33F h/o SLE (on Plaquenil),  currently 3rd trimester pregnancy had recent annual physical with EKG done found with new T-wave inversion V1-V3, presents for cardiology evaluation.   Reports feeling well, has chronic intermittent palpitations mainly as skip beats for few seconds, no other symptoms. No routine exercise has a toddler son, no issue on her prior pregnancy. On LMWH for prior pregnancy loss but no prior history VTE, also on low dose ASA.   Recent lab 2024:  , , HDL 86,  (prior in  , )   Prior Echo done in 2018 with LV EF 60-65% done for suspected pericarditis?   MEL 24- [facility]  No CAD or stroke in family Nonsmoker, no alcohol use

## 2024-05-14 ENCOUNTER — APPOINTMENT (OUTPATIENT)
Dept: CARDIOLOGY | Facility: CLINIC | Age: 34
End: 2024-05-14
Payer: COMMERCIAL

## 2024-05-14 PROCEDURE — 93306 TTE W/DOPPLER COMPLETE: CPT

## 2024-06-07 ENCOUNTER — APPOINTMENT (OUTPATIENT)
Dept: PEDIATRICS | Facility: CLINIC | Age: 34
End: 2024-06-07
Payer: COMMERCIAL

## 2024-06-07 DIAGNOSIS — Z76.81 EXPECTANT PARENT(S) PREBIRTH PEDIATRICIAN VISIT: ICD-10-CM

## 2024-06-07 PROCEDURE — ZZZZZ: CPT

## 2024-06-07 NOTE — HISTORY OF PRESENT ILLNESS
[FreeTextEntry6] : Z76.81 Pediatric Prebirth Visit 26733 Consult Inquiries for Prenatal problems that may have implications for the baby reviewed Breast-feeding best practices, benefits, risks of not breast-feeding also reviewed.  [email or web address] to access good information regarding breast-feeding in the first 3 days of life and beyond provided.  Fist day and days of breast feeding summarized.   Importance of maternal self care as a tactic to improve breast feeding, including fixing painful latching, was emphasized. Risks and benefits of vaccines as well as not vaccinating within the timeframe recommended was reviewed.  This included the hepatitis B vaccine in the hospital that will be recommended. How to get in touch with the doctor on-call, and how to access the right online care in a de-escalating manner was reviewed.  Especially, through [email or web address], tools tab, and symptom .  This may decrease the perceived need for unnecessary access to care, and direct toward urgent care more when appropriate.  For example, 100.2 F temperature rectally is not necessarily a fever. Generic searches of fever in an infant may direct parents to emergent care unnecessarily.  A variety of other questions were reviewed regarding newborns and  beyond that.   Age and development based approach to parenting reviewed.  We will see the baby within approximately  2 days of leaving the hospital. .

## 2024-09-10 ENCOUNTER — RESULT REVIEW (OUTPATIENT)
Age: 34
End: 2024-09-10

## 2024-09-10 ENCOUNTER — APPOINTMENT (OUTPATIENT)
Dept: INTERNAL MEDICINE | Facility: CLINIC | Age: 34
End: 2024-09-10
Payer: COMMERCIAL

## 2024-09-10 VITALS
BODY MASS INDEX: 21.16 KG/M2 | DIASTOLIC BLOOD PRESSURE: 75 MMHG | RESPIRATION RATE: 11 BRPM | HEART RATE: 89 BPM | HEIGHT: 62 IN | SYSTOLIC BLOOD PRESSURE: 110 MMHG | OXYGEN SATURATION: 98 % | WEIGHT: 115 LBS

## 2024-09-10 DIAGNOSIS — N63.0 UNSPECIFIED LUMP IN UNSPECIFIED BREAST: ICD-10-CM

## 2024-09-10 DIAGNOSIS — R10.13 EPIGASTRIC PAIN: ICD-10-CM

## 2024-09-10 PROCEDURE — 99214 OFFICE O/P EST MOD 30 MIN: CPT

## 2024-09-10 PROCEDURE — 36415 COLL VENOUS BLD VENIPUNCTURE: CPT

## 2024-09-10 PROCEDURE — G2211 COMPLEX E/M VISIT ADD ON: CPT | Mod: NC

## 2024-09-10 NOTE — ASSESSMENT
[FreeTextEntry1] : Venipuncture done in our office, Labs sent out.  Breast sonogram and abdominal sonogram ordered.  Further recommendations pending blood work and imaging.  Patient will return to the office for CPE  Dr. Youssef was present in office building while I examined patient

## 2024-09-10 NOTE — PHYSICAL EXAM
[No Acute Distress] : no acute distress [No Respiratory Distress] : no respiratory distress  [Clear to Auscultation] : lungs were clear to auscultation bilaterally [Normal Rate] : normal rate  [Regular Rhythm] : with a regular rhythm [Normal Affect] : the affect was normal [Normal Mood] : the mood was normal [No Nipple Discharge] : no nipple discharge [Soft] : abdomen soft [Non Tender] : non-tender [Normal Bowel Sounds] : normal bowel sounds [de-identified] : +<pea sized superficial/just below skin cystic lesion right breast approximately 9 o'clock position. Left breast is normal [de-identified] : No guarding/rebound, negative Rubalcava's

## 2024-09-10 NOTE — HISTORY OF PRESENT ILLNESS
[FreeTextEntry8] : Patient presents complaining of 1.  Last night she was breast-feeding and felt a lump in her right breast area.  Patient has no family history of breast cancer but is very concerned.  Patient is asymptomatic without pain/pruritus or any abnormal nipple discharge  2.  Patient also states for about 4 days she has had mild epigastric discomfort, feels it if she palpates the area.  Patient denies vomiting/back pain/chest pain/dyspnea/GERD, eating normally.  "Do I have pancreatic cancer"

## 2024-09-11 ENCOUNTER — OUTPATIENT (OUTPATIENT)
Dept: OUTPATIENT SERVICES | Facility: HOSPITAL | Age: 34
LOS: 1 days | End: 2024-09-11
Payer: COMMERCIAL

## 2024-09-11 ENCOUNTER — RESULT REVIEW (OUTPATIENT)
Age: 34
End: 2024-09-11

## 2024-09-11 ENCOUNTER — APPOINTMENT (OUTPATIENT)
Dept: ULTRASOUND IMAGING | Facility: CLINIC | Age: 34
End: 2024-09-11

## 2024-09-11 DIAGNOSIS — Z98.82 BREAST IMPLANT STATUS: Chronic | ICD-10-CM

## 2024-09-11 DIAGNOSIS — N63.0 UNSPECIFIED LUMP IN UNSPECIFIED BREAST: ICD-10-CM

## 2024-09-11 LAB
ALBUMIN SERPL ELPH-MCNC: 4.6 G/DL
ALP BLD-CCNC: 88 U/L
ALT SERPL-CCNC: 21 U/L
AMYLASE/CREAT SERPL: 57 U/L
ANION GAP SERPL CALC-SCNC: 12 MMOL/L
AST SERPL-CCNC: 18 U/L
BASOPHILS # BLD AUTO: 0.06 K/UL
BASOPHILS NFR BLD AUTO: 0.7 %
BILIRUB SERPL-MCNC: 1 MG/DL
BUN SERPL-MCNC: 16 MG/DL
CALCIUM SERPL-MCNC: 9.5 MG/DL
CHLORIDE SERPL-SCNC: 102 MMOL/L
CO2 SERPL-SCNC: 25 MMOL/L
CREAT SERPL-MCNC: 0.62 MG/DL
EGFR: 120 ML/MIN/1.73M2
EOSINOPHIL # BLD AUTO: 0.07 K/UL
EOSINOPHIL NFR BLD AUTO: 0.8 %
GLUCOSE SERPL-MCNC: 100 MG/DL
HCT VFR BLD CALC: 42.3 %
HGB BLD-MCNC: 13.4 G/DL
IMM GRANULOCYTES NFR BLD AUTO: 0.1 %
LPL SERPL-CCNC: 36 U/L
LYMPHOCYTES # BLD AUTO: 2.28 K/UL
LYMPHOCYTES NFR BLD AUTO: 27.1 %
MAN DIFF?: NORMAL
MCHC RBC-ENTMCNC: 29.4 PG
MCHC RBC-ENTMCNC: 31.7 GM/DL
MCV RBC AUTO: 92.8 FL
MONOCYTES # BLD AUTO: 0.39 K/UL
MONOCYTES NFR BLD AUTO: 4.6 %
NEUTROPHILS # BLD AUTO: 5.6 K/UL
NEUTROPHILS NFR BLD AUTO: 66.7 %
PLATELET # BLD AUTO: 310 K/UL
POTASSIUM SERPL-SCNC: 4.4 MMOL/L
PROT SERPL-MCNC: 7 G/DL
RBC # BLD: 4.56 M/UL
RBC # FLD: 14 %
SODIUM SERPL-SCNC: 140 MMOL/L
WBC # FLD AUTO: 8.41 K/UL

## 2024-09-11 PROCEDURE — 76642 ULTRASOUND BREAST LIMITED: CPT

## 2024-09-11 PROCEDURE — 76642 ULTRASOUND BREAST LIMITED: CPT | Mod: 26,RT

## 2024-09-12 ENCOUNTER — APPOINTMENT (OUTPATIENT)
Dept: ULTRASOUND IMAGING | Facility: CLINIC | Age: 34
End: 2024-09-12

## 2024-11-02 ENCOUNTER — OUTPATIENT (OUTPATIENT)
Dept: OUTPATIENT SERVICES | Facility: HOSPITAL | Age: 34
LOS: 1 days | End: 2024-11-02
Payer: COMMERCIAL

## 2024-11-02 ENCOUNTER — APPOINTMENT (OUTPATIENT)
Dept: ULTRASOUND IMAGING | Facility: CLINIC | Age: 34
End: 2024-11-02

## 2024-11-02 DIAGNOSIS — R10.13 EPIGASTRIC PAIN: ICD-10-CM

## 2024-11-02 DIAGNOSIS — Z98.890 OTHER SPECIFIED POSTPROCEDURAL STATES: Chronic | ICD-10-CM

## 2024-11-02 DIAGNOSIS — Z98.82 BREAST IMPLANT STATUS: Chronic | ICD-10-CM

## 2024-11-02 DIAGNOSIS — Z00.8 ENCOUNTER FOR OTHER GENERAL EXAMINATION: ICD-10-CM

## 2024-11-02 PROCEDURE — 76700 US EXAM ABDOM COMPLETE: CPT

## 2024-11-02 PROCEDURE — 76700 US EXAM ABDOM COMPLETE: CPT | Mod: 26

## 2024-11-04 ENCOUNTER — TRANSCRIPTION ENCOUNTER (OUTPATIENT)
Age: 34
End: 2024-11-04

## 2024-12-10 ENCOUNTER — APPOINTMENT (OUTPATIENT)
Dept: INTERNAL MEDICINE | Facility: CLINIC | Age: 34
End: 2024-12-10
Payer: COMMERCIAL

## 2024-12-10 VITALS
RESPIRATION RATE: 12 BRPM | SYSTOLIC BLOOD PRESSURE: 110 MMHG | BODY MASS INDEX: 19.94 KG/M2 | WEIGHT: 109 LBS | DIASTOLIC BLOOD PRESSURE: 70 MMHG | HEART RATE: 82 BPM

## 2024-12-10 DIAGNOSIS — R63.4 ABNORMAL WEIGHT LOSS: ICD-10-CM

## 2024-12-10 DIAGNOSIS — R63.39 OTHER FEEDING DIFFICULTIES: ICD-10-CM

## 2024-12-10 PROCEDURE — G2211 COMPLEX E/M VISIT ADD ON: CPT | Mod: NC

## 2024-12-10 PROCEDURE — 36415 COLL VENOUS BLD VENIPUNCTURE: CPT

## 2024-12-10 PROCEDURE — 99213 OFFICE O/P EST LOW 20 MIN: CPT

## 2024-12-11 ENCOUNTER — TRANSCRIPTION ENCOUNTER (OUTPATIENT)
Age: 34
End: 2024-12-11

## 2024-12-11 LAB
ALBUMIN SERPL ELPH-MCNC: 4.5 G/DL
ALP BLD-CCNC: 113 U/L
ALT SERPL-CCNC: 16 U/L
ANION GAP SERPL CALC-SCNC: 15 MMOL/L
AST SERPL-CCNC: 19 U/L
BASOPHILS # BLD AUTO: 0.05 K/UL
BASOPHILS NFR BLD AUTO: 0.8 %
BILIRUB SERPL-MCNC: 1 MG/DL
BUN SERPL-MCNC: 13 MG/DL
CALCIUM SERPL-MCNC: 10 MG/DL
CHLORIDE SERPL-SCNC: 100 MMOL/L
CO2 SERPL-SCNC: 24 MMOL/L
CREAT SERPL-MCNC: 0.5 MG/DL
EGFR: 126 ML/MIN/1.73M2
EOSINOPHIL # BLD AUTO: 0.05 K/UL
EOSINOPHIL NFR BLD AUTO: 0.8 %
GLUCOSE SERPL-MCNC: 76 MG/DL
HCT VFR BLD CALC: 43.3 %
HGB BLD-MCNC: 14.3 G/DL
IMM GRANULOCYTES NFR BLD AUTO: 0.2 %
LYMPHOCYTES # BLD AUTO: 2.04 K/UL
LYMPHOCYTES NFR BLD AUTO: 31.1 %
MAN DIFF?: NORMAL
MCHC RBC-ENTMCNC: 30.8 PG
MCHC RBC-ENTMCNC: 33 G/DL
MCV RBC AUTO: 93.1 FL
MONOCYTES # BLD AUTO: 0.35 K/UL
MONOCYTES NFR BLD AUTO: 5.3 %
NEUTROPHILS # BLD AUTO: 4.06 K/UL
NEUTROPHILS NFR BLD AUTO: 61.8 %
PLATELET # BLD AUTO: 330 K/UL
POTASSIUM SERPL-SCNC: 4.3 MMOL/L
PROT SERPL-MCNC: 7.4 G/DL
RBC # BLD: 4.65 M/UL
RBC # FLD: 13.3 %
SODIUM SERPL-SCNC: 139 MMOL/L
T4 FREE SERPL-MCNC: 1.2 NG/DL
TSH SERPL-ACNC: 1.56 UIU/ML
WBC # FLD AUTO: 6.56 K/UL

## 2025-01-30 ENCOUNTER — APPOINTMENT (OUTPATIENT)
Dept: INTERNAL MEDICINE | Facility: CLINIC | Age: 35
End: 2025-01-30
Payer: COMMERCIAL

## 2025-01-30 DIAGNOSIS — R63.4 ABNORMAL WEIGHT LOSS: ICD-10-CM

## 2025-01-30 PROCEDURE — 98016 BRIEF COMUNICAJ TECH-BSD SVC: CPT

## 2025-03-14 ENCOUNTER — APPOINTMENT (OUTPATIENT)
Dept: ULTRASOUND IMAGING | Facility: CLINIC | Age: 35
End: 2025-03-14

## 2025-03-21 ENCOUNTER — OUTPATIENT (OUTPATIENT)
Dept: OUTPATIENT SERVICES | Facility: HOSPITAL | Age: 35
LOS: 1 days | End: 2025-03-21
Payer: COMMERCIAL

## 2025-03-21 ENCOUNTER — APPOINTMENT (OUTPATIENT)
Dept: ULTRASOUND IMAGING | Facility: CLINIC | Age: 35
End: 2025-03-21

## 2025-03-21 DIAGNOSIS — Z98.82 BREAST IMPLANT STATUS: Chronic | ICD-10-CM

## 2025-03-21 DIAGNOSIS — Z98.890 OTHER SPECIFIED POSTPROCEDURAL STATES: Chronic | ICD-10-CM

## 2025-03-21 DIAGNOSIS — E04.1 NONTOXIC SINGLE THYROID NODULE: ICD-10-CM

## 2025-03-21 PROCEDURE — 76536 US EXAM OF HEAD AND NECK: CPT | Mod: 26

## 2025-04-16 ENCOUNTER — APPOINTMENT (OUTPATIENT)
Dept: DERMATOLOGY | Facility: CLINIC | Age: 35
End: 2025-04-16

## 2025-05-25 ENCOUNTER — NON-APPOINTMENT (OUTPATIENT)
Age: 35
End: 2025-05-25

## 2025-05-26 ENCOUNTER — NON-APPOINTMENT (OUTPATIENT)
Age: 35
End: 2025-05-26

## 2025-07-12 ENCOUNTER — NON-APPOINTMENT (OUTPATIENT)
Age: 35
End: 2025-07-12

## 2025-08-18 ENCOUNTER — NON-APPOINTMENT (OUTPATIENT)
Age: 35
End: 2025-08-18

## 2025-08-21 ENCOUNTER — APPOINTMENT (OUTPATIENT)
Dept: DERMATOLOGY | Facility: CLINIC | Age: 35
End: 2025-08-21
Payer: COMMERCIAL

## 2025-08-21 PROCEDURE — 99203 OFFICE O/P NEW LOW 30 MIN: CPT
